# Patient Record
Sex: FEMALE | Race: WHITE | NOT HISPANIC OR LATINO | Employment: FULL TIME | URBAN - METROPOLITAN AREA
[De-identification: names, ages, dates, MRNs, and addresses within clinical notes are randomized per-mention and may not be internally consistent; named-entity substitution may affect disease eponyms.]

---

## 2017-01-03 ENCOUNTER — ALLSCRIPTS OFFICE VISIT (OUTPATIENT)
Dept: OTHER | Facility: OTHER | Age: 32
End: 2017-01-03

## 2017-01-03 DIAGNOSIS — F17.210 CIGARETTE NICOTINE DEPENDENCE, UNCOMPLICATED: ICD-10-CM

## 2017-01-03 DIAGNOSIS — N94.6 DYSMENORRHEA: ICD-10-CM

## 2017-01-03 DIAGNOSIS — G43.909 MIGRAINE WITHOUT STATUS MIGRAINOSUS, NOT INTRACTABLE: ICD-10-CM

## 2017-04-20 DIAGNOSIS — K80.20 CALCULUS OF GALLBLADDER WITHOUT CHOLECYSTITIS WITHOUT OBSTRUCTION: ICD-10-CM

## 2017-04-20 DIAGNOSIS — R93.2 ABNORMAL FINDINGS ON DIAGNOSTIC IMAGING OF LIVER AND BILIARY TRACT: ICD-10-CM

## 2017-04-20 DIAGNOSIS — R05.9 COUGH: ICD-10-CM

## 2017-04-20 DIAGNOSIS — R53.83 OTHER FATIGUE: ICD-10-CM

## 2017-04-29 LAB — HCV AB SER-ACNC: NEGATIVE

## 2017-09-11 ENCOUNTER — ALLSCRIPTS OFFICE VISIT (OUTPATIENT)
Dept: OTHER | Facility: OTHER | Age: 32
End: 2017-09-11

## 2017-09-11 DIAGNOSIS — F41.9 ANXIETY DISORDER: ICD-10-CM

## 2017-09-11 DIAGNOSIS — R00.0 TACHYCARDIA: ICD-10-CM

## 2017-09-11 DIAGNOSIS — R35.0 FREQUENCY OF MICTURITION: ICD-10-CM

## 2017-09-12 ENCOUNTER — GENERIC CONVERSION - ENCOUNTER (OUTPATIENT)
Dept: OTHER | Facility: OTHER | Age: 32
End: 2017-09-12

## 2017-09-25 ENCOUNTER — GENERIC CONVERSION - ENCOUNTER (OUTPATIENT)
Dept: FAMILY MEDICINE CLINIC | Facility: CLINIC | Age: 32
End: 2017-09-25

## 2017-09-25 ENCOUNTER — GENERIC CONVERSION - ENCOUNTER (OUTPATIENT)
Dept: OTHER | Facility: OTHER | Age: 32
End: 2017-09-25

## 2017-11-09 ENCOUNTER — ALLSCRIPTS OFFICE VISIT (OUTPATIENT)
Dept: OTHER | Facility: OTHER | Age: 32
End: 2017-11-09

## 2017-11-11 NOTE — PROGRESS NOTES
Assessment    1  Migraine, unspecified, not intractable, without status migrainosus (346 90) (G43 909)  2  Wheezing (786 07) (R06 2)  3  Anxiety (300 00) (F41 9)    Plan  Anxiety    · Renew: LORazepam 0 5 MG Oral Tablet; TAKE 1 TABLET 3 TIMEs daily and TWO at [facility]   · Renew: Sertraline HCl - 50 MG Oral Tablet; Take 1 and 1/2 tablet daily   · Continue with our present treatment plan ; Status:Complete;   Done: [id number]  Wheezing    · Start: ProAir  (90 Base) MCG/ACT Inhalation Aerosol Solution; INHALE 2 PUFFSEVERY 4-6 HOURS AS NEEDED    Discussion/Summary    Patient is here for anxiety check  She is doing better overall  A we will up her sertraline to 75 mg daily  She will update me on some in about 2-4 weeks on how she is doing  We will make a formal follow-up pending her clinical improvement has got the flu shot at work  The patient was counseled regarding instructions for management,-- impressions,-- importance of compliance with treatment  total time of encounter was 20 minutes-- and-- 50% minutes was spent counseling  Chief Complaint  F/U anxiety  ksd,cma      History of Present Illness  Another death in the family  Patient's anxiety level has been fair  She is doing better at nighttime  She try to not use the lorazepam at bedtime for several nights and did well for a bit  She feels that she would like to go up a bit on the sertraline  She also is interested in starting the Wellbutrin for smoking cessation  Encouraged her to do so and advised her that there is no interaction to taking SSRI and the dopamine agent  The patient is being seen for follow-up of and Patient needs refill on her p r n  albuterol wheezing  The patient is being seen for follow-up of generalized anxiety disorder and With some panic  The patient reports doing well and Some moments of anxiety flares but overall doing better  Comorbid Illnesses: Fear of death  She has no comorbid illnesses    Interval symptoms:  improved anxiety,-- improved panic attacks-- and-- improved sleep disruption  Associated symptoms: racing thoughts  Medications:  the patient is adherent to her medication regimen  Review of Systems   Constitutional: not feeling tired  Eyes: no purulent discharge from the eyes  ENT: no nasal discharge  Cardiovascular: no chest pain-- and-- no palpitations  Respiratory: no cough-- and-- no shortness of breath during exertion  Gastrointestinal: no constipation  Genitourinary: no incontinence  Musculoskeletal: no arthralgias  Neurological: headache-- and-- Migraines have been stable even with decreasing the Topamax  ROS reviewed  Active Problems  1  Anxiety (300 00) (F41 9)  2  Cigarette smoker (305 1) (F17 210)  3  Depression screening (V79 0) (Z13 89)  4  Encounter for smoking cessation counseling (V65 42,305 1) (Z71 6,Z72 0)  5  Fatigue (780 79) (R53 83)  6  Fear of death (300 20) (F40 8)  7  Gallstone (impacted) (574 20) (K80 20)  8  Migraine, unspecified, not intractable, without status migrainosus (346 90) (G43 909)  9  Mitral regurgitation (424 0) (I34 0)  10  Urine frequency (788 41) (R35 0)    Past Medical History  1  Encounter for smoking cessation counseling (V65 42,305 1) [phone]  0)    The active problems and past medical history were reviewed and updated today  Surgical History  1  History of Foot Surgery    The surgical history was reviewed and updated today  Family History  Mother   1  Family history of Meningioma  2  Family history of Seizure  Father   3  Family history of Cancer of kidney    The family history was reviewed and updated today  Social History     · Being A Social Drinker   · Cigarette smoker (305 1) (F17 210)   · Never A Smoker  The social history was reviewed and updated today  Current Meds  1  Acyclovir 800 MG Oral Tablet; Take one tab three times a day for up to five days ;  Therapy: 20TIX3712 to (Last XA:55KLX9446)  Requested for: 87TVT4603 Ordered  2  BuPROPion HCl ER (SR) 150 MG Oral Tablet Extended Release 12 Hour; TAKE 1 TABLET DAILY AS DIRECTED; Therapy: 09MPB5138 to (Evaluate:06Jun2017)  Requested for: 26KUY6778; Last Rx:08Mar2017 Ordered  3  Gildess FE 1 5/30 1 5-30 MG-MCG TABS; Therapy: 56YQM0593 to (Evaluate:68Psb0665) Recorded  4  Ibuprofen 200 MG Oral Capsule; Therapy: 58LJQ7933 to Recorded  5  LORazepam 0 5 MG Oral Tablet; TAKE 1 TABLET 3 TIMEs daily and TWO at HS PRN; Therapy: 67Tna1998 to (Evaluate:05Oct2017); Last Rx:69Dxb4334 Ordered  6  Mefenamic Acid 250 MG Oral Capsule; TAKE 2 CAPSULES NOW, THEN 1 CAPSULE EVERY 6 HOURS AS NEEDED; Therapy: 11KNL9295 to (Evaluate:19Jan2017)  Requested for: 44NDX2778; Last AV:37URC1257 Ordered  7  Sertraline HCl - 50 MG Oral Tablet; Take 1 tablet daily; Therapy: 09Nas9617 to (Evaluate:10Nov2017)  Requested for: 37HFP0085; Last Rx:99Euu6282 Ordered  8  Topiramate 25 MG Oral Tablet; three P O  Pm; Therapy: 30VQY3737 to (Aleta Locker)  Requested for: 34ZYZ5179; Last XF:78FTR0482 Ordered  9  Vitamin B12 TABS; Therapy: (Recorded:11Jan2017) to Recorded  10  Vitamin D3 TABS; Therapy: (Recorded:11Jan2017) to Recorded    Allergies  1  No Known Drug Allergies    Vitals  Vital Signs    Recorded: 50NPL4204 12:51PM Recorded: 49DVB5009 90:58AZ   Systolic 749    Diastolic 62    Height  5 ft 2 in   Weight  230 lb 8 oz   BMI Calculated  42 16   BSA Calculated  2 03       Physical Exam   Constitutional  General appearance: No acute distress, well appearing and well nourished  well developed,-- obese-- and-- appearance reflects stated age  Ears, Nose, Mouth, and Throat  Nasal mucosa, septum, and turbinates: Normal without edema or erythema  Oropharynx: Normal with no erythema, edema, exudate or lesions  Pulmonary  Auscultation of lungs: Clear to auscultation  Cardiovascular  Auscultation of heart: Normal rate and rhythm, normal S1 and S2, without murmurs  Abdomen  Abdomen: Non-tender, no masses

## 2018-01-13 VITALS
WEIGHT: 226.5 LBS | BODY MASS INDEX: 41.68 KG/M2 | SYSTOLIC BLOOD PRESSURE: 118 MMHG | HEIGHT: 62 IN | DIASTOLIC BLOOD PRESSURE: 80 MMHG

## 2018-01-14 VITALS
WEIGHT: 224.13 LBS | BODY MASS INDEX: 41.24 KG/M2 | HEIGHT: 62 IN | DIASTOLIC BLOOD PRESSURE: 72 MMHG | SYSTOLIC BLOOD PRESSURE: 112 MMHG

## 2018-01-14 VITALS
BODY MASS INDEX: 42.42 KG/M2 | SYSTOLIC BLOOD PRESSURE: 118 MMHG | DIASTOLIC BLOOD PRESSURE: 62 MMHG | HEIGHT: 62 IN | WEIGHT: 230.5 LBS

## 2018-01-22 VITALS — DIASTOLIC BLOOD PRESSURE: 72 MMHG | SYSTOLIC BLOOD PRESSURE: 118 MMHG

## 2018-01-22 VITALS — HEIGHT: 62 IN | WEIGHT: 226 LBS | BODY MASS INDEX: 41.59 KG/M2

## 2018-04-12 DIAGNOSIS — F32.A DEPRESSION, UNSPECIFIED DEPRESSION TYPE: Primary | ICD-10-CM

## 2018-04-17 RX ORDER — CHOLECALCIFEROL (VITAMIN D3) 125 MCG
CAPSULE ORAL
COMMUNITY

## 2018-04-17 RX ORDER — LANOLIN ALCOHOL/MO/W.PET/CERES
CREAM (GRAM) TOPICAL
COMMUNITY

## 2018-04-17 RX ORDER — TOPIRAMATE 25 MG/1
TABLET ORAL
COMMUNITY
Start: 2011-09-15 | End: 2021-09-14

## 2018-04-17 RX ORDER — BUPROPION HYDROCHLORIDE 150 MG/1
1 TABLET, EXTENDED RELEASE ORAL DAILY
COMMUNITY
Start: 2017-03-08 | End: 2020-07-10

## 2018-04-17 RX ORDER — MEFENAMIC ACID 250 MG/1
2 CAPSULE ORAL EVERY 6 HOURS PRN
COMMUNITY
Start: 2017-01-03

## 2018-04-17 RX ORDER — LORAZEPAM 0.5 MG/1
TABLET ORAL
COMMUNITY
Start: 2017-09-11 | End: 2018-04-18 | Stop reason: SDUPTHER

## 2018-04-17 RX ORDER — NORETHINDRONE ACETATE AND ETHINYL ESTRADIOL 1.5-30(21)
KIT ORAL
COMMUNITY
Start: 2014-08-11 | End: 2018-04-18 | Stop reason: SINTOL

## 2018-04-17 RX ORDER — ALBUTEROL SULFATE 90 UG/1
2 AEROSOL, METERED RESPIRATORY (INHALATION)
COMMUNITY
Start: 2017-11-09

## 2018-04-17 RX ORDER — OMEGA-3 FATTY ACIDS/FISH OIL 300-1000MG
CAPSULE ORAL
COMMUNITY
Start: 2014-11-03

## 2018-04-18 ENCOUNTER — OFFICE VISIT (OUTPATIENT)
Dept: FAMILY MEDICINE CLINIC | Facility: CLINIC | Age: 33
End: 2018-04-18

## 2018-04-18 VITALS
BODY MASS INDEX: 44.79 KG/M2 | SYSTOLIC BLOOD PRESSURE: 118 MMHG | HEIGHT: 62 IN | WEIGHT: 243.4 LBS | DIASTOLIC BLOOD PRESSURE: 72 MMHG

## 2018-04-18 DIAGNOSIS — F40.298 FEAR OF DEATH: ICD-10-CM

## 2018-04-18 DIAGNOSIS — R63.5 WEIGHT GAIN: ICD-10-CM

## 2018-04-18 DIAGNOSIS — R60.9 FLUID RETENTION: Primary | ICD-10-CM

## 2018-04-18 DIAGNOSIS — E66.01 MORBID OBESITY (HCC): ICD-10-CM

## 2018-04-18 DIAGNOSIS — F41.9 ANXIETY: ICD-10-CM

## 2018-04-18 PROBLEM — I34.0 MITRAL REGURGITATION: Status: ACTIVE | Noted: 2017-01-03

## 2018-04-18 LAB
SL AMB  POCT GLUCOSE, UA: ABNORMAL
SL AMB LEUKOCYTE ESTERASE,UA: ABNORMAL
SL AMB POCT BILIRUBIN,UA: ABNORMAL
SL AMB POCT BLOOD,UA: ABNORMAL
SL AMB POCT CLARITY,UA: ABNORMAL
SL AMB POCT COLOR,UA: YELLOW
SL AMB POCT KETONES,UA: ABNORMAL
SL AMB POCT NITRITE,UA: ABNORMAL
SL AMB POCT PH,UA: 5
SL AMB POCT SPECIFIC GRAVITY,UA: 1
SL AMB POCT URINE PROTEIN: ABNORMAL
SL AMB POCT UROBILINOGEN: ABNORMAL

## 2018-04-18 PROCEDURE — 81002 URINALYSIS NONAUTO W/O SCOPE: CPT | Performed by: FAMILY MEDICINE

## 2018-04-18 PROCEDURE — 99214 OFFICE O/P EST MOD 30 MIN: CPT | Performed by: FAMILY MEDICINE

## 2018-04-18 RX ORDER — LORAZEPAM 0.5 MG/1
0.5 TABLET ORAL
Qty: 30 TABLET | Refills: 3 | Status: SHIPPED | OUTPATIENT
Start: 2018-04-18 | End: 2020-07-10 | Stop reason: SDUPTHER

## 2018-04-18 RX ORDER — TRIAMTERENE AND HYDROCHLOROTHIAZIDE 37.5; 25 MG/1; MG/1
1 CAPSULE ORAL EVERY MORNING
Qty: 30 CAPSULE | Refills: 0 | Status: SHIPPED | OUTPATIENT
Start: 2018-04-18 | End: 2020-07-10

## 2018-04-18 NOTE — PROGRESS NOTES
Assessment/Plan:   Diagnoses and all orders for this visit:    Fluid retention  -     TSH, 3rd generation; Future  -     Comprehensive metabolic panel; Future  -     HEMOGLOBIN A1C W/ EAG ESTIMATION; Future  -     triamterene-hydrochlorothiazide (DYAZIDE) 37 5-25 mg per capsule; Take 1 capsule by mouth every morning  -     POCT urine dip    Weight gain  -     TSH, 3rd generation; Future  -     Comprehensive metabolic panel; Future  -     HEMOGLOBIN A1C W/ EAG ESTIMATION; Future  -     triamterene-hydrochlorothiazide (DYAZIDE) 37 5-25 mg per capsule; Take 1 capsule by mouth every morning    Morbid obesity (HCC)  -     Discontinue: Naltrexone-Bupropion HCl ER (CONTRAVE) 8-90 MG TB12; Take 1 tablet by mouth daily  -     Naltrexone-Bupropion HCl ER (CONTRAVE) 8-90 MG TB12; Take 1 tablet by mouth daily    Anxiety  -     LORazepam (ATIVAN) 0 5 mg tablet; Take 1 tablet (0 5 mg total) by mouth daily at bedtime    Fear of death  -     LORazepam (ATIVAN) 0 5 mg tablet; Take 1 tablet (0 5 mg total) by mouth daily at bedtime    Other orders  -     buPROPion (WELLBUTRIN SR) 150 mg 12 hr tablet; Take 1 tablet by mouth daily  -     Discontinue: norethindrone-ethinyl estradiol-iron (GILDESS FE 1 5/30) 1 5-30 MG-MCG tablet; Take by mouth  -     Ibuprofen 200 MG CAPS; Take by mouth  -     Discontinue: LORazepam (ATIVAN) 0 5 mg tablet; Take by mouth  -     Mefenamic Acid 250 MG CAPS; Take 2 capsules by mouth every 6 (six) hours as needed  -     albuterol (PROAIR HFA) 90 mcg/act inhaler; Inhale 2 puffs  -     topiramate (TOPAMAX) 25 mg tablet; Take by mouth  -     cyanocobalamin (VITAMIN B-12) 1,000 mcg tablet;  Take by mouth  -     Cholecalciferol (VITAMIN D3) 2000 units TABS; Take by mouth         with regard to recent symptoms of fluid retention and overall gradual weight gain in the last several months and a trace amount of protein seen in the urine updated blood work will be ordered to check on renal function, thyroid and Diabetes screen  Doubt cardiac in nature as patient has had a normal echocardiogram with regards to ejection fraction in September of 2016  Exam is unremarkable  Would use diuretic  With caution and hold for now until BUN and creatinine results are back  Patient will see if the Henri Aleman is covered with regards to treatment for obesity  Continued smoking cessation encouraged  Subjective:   Chief Complaint   Patient presents with    Edema     Has been retaining fluid in feet and ankles for the past 4-5 days      Patient ID: Santos Carreno is a 28 y o  female  Patient is a pleasant 18-year-old white female who is here for complaints of some water retention in the feet in the ankles for the past 4-5 days  There is no new dietary changes  There is no travel  Patient has been doing well since regards to migraine as well as anxiety  Unfortunately she is still smoking  She is not doing the Chantix  She is interested in discussing possible pharmacological intervention for treatment of obesity  Spot urine dipstick in the office did show trace protein  Obesity is worsening  Discussed the patient's BMI  The BMI is above average; BMI management plan is completed  General weight loss/lifestyle modification strategies discussed (elicit support from others; identify saboteurs; non-food rewards, etc)  Behavioral treatment: [employer] programs ( [employer])  Diet interventions: diet diary for the following  1500 calorie  Informal exercise measures discussed, e g  taking stairs instead of elevator  Pharmacotherapy as ordered  Follow up in: 1month and as needed  The following portions of the patient's history were reviewed and updated as appropriate: allergies, current medications, past family history, past medical history, past social history, past surgical history and problem list       Review of Systems   Constitutional: Positive for unexpected weight change  HENT: Negative  Respiratory:        Patient has some increase in shortness of breath and last exercise endurance although she is not really on a fitness plan at this point  She is blaming the symptoms on the weight she has had a recent echo which showed normal cardiac function and mild mitral regurg   Cardiovascular: Positive for leg swelling ( generalizedAnkle swelling and also feels like her hands are puffy)  Negative for chest pain  Gastrointestinal: Negative  Genitourinary: Negative  Musculoskeletal: Negative  Skin: Negative  Neurological: Negative  Migraines controlled   Psychiatric/Behavioral:        Patient is doing well from this standpoint anxiety control         Objective:  /72   Ht 5' 2" (1 575 m)   Wt 110 kg (243 lb 6 4 oz)   BMI 44 52 kg/m²  this weight does represent a 13 lb gain since last visit in November 2017 4/18/18  4:27 PM   LEUKOCYTE ESTERASE,UA neg     NITRITE,UA neg    SL AMB POCT UROBILINOGEN neg    SL AMB POCT URINE PROTEIN trace     PH,UA 5 0     BLOOD,UA neg     SPECIFIC GRAVITY,UA 1 005     KETONES,UA neg     BILIRUBIN,UA neg    GLUCOSE, UA neg     COLOR,UA yellow     CLARITY,UA hazy         Physical Exam   Constitutional: She is oriented to person, place, and time  She appears well-developed and well-nourished  Pleasant 69-year-old female who appears her stated age in no acute distress with BMI of 44%   HENT:   Head: Normocephalic and atraumatic  Eyes: EOM are normal  Pupils are equal, round, and reactive to light  Neck: Normal range of motion  Cardiovascular: Normal rate, regular rhythm and intact distal pulses  No murmur heard  Trace ankle edema   Pulmonary/Chest: Effort normal and breath sounds normal  No respiratory distress  She has no rales  Abdominal: Soft  Bowel sounds are normal  She exhibits no distension  Musculoskeletal: Normal range of motion  Neurological: She is alert and oriented to person, place, and time     Psychiatric: She has a normal mood and affect   Her behavior is normal  Judgment and thought content normal    Concerned

## 2018-04-20 ENCOUNTER — TELEPHONE (OUTPATIENT)
Dept: FAMILY MEDICINE CLINIC | Facility: CLINIC | Age: 33
End: 2018-04-20

## 2018-04-20 LAB — HBA1C MFR BLD HPLC: 5.5 %

## 2018-04-20 NOTE — TELEPHONE ENCOUNTER
Can try every other day  I would want her to lose more than 2 lb in water weight in 1 day so she can gauge by that a bit  Also if she feels that she is not retaining fluid she does not need to take it it may be totally p r n

## 2018-04-20 NOTE — TELEPHONE ENCOUNTER
Patient would like to know how she should take the medication  Everyday? Every other day?  Patient states it was based on the bloodwork

## 2019-06-19 DIAGNOSIS — F32.A DEPRESSION, UNSPECIFIED DEPRESSION TYPE: ICD-10-CM

## 2019-10-07 ENCOUNTER — TELEPHONE (OUTPATIENT)
Dept: FAMILY MEDICINE CLINIC | Facility: CLINIC | Age: 34
End: 2019-10-07

## 2019-10-07 DIAGNOSIS — B02.9 HERPES ZOSTER WITHOUT COMPLICATION: Primary | ICD-10-CM

## 2019-10-07 RX ORDER — VALACYCLOVIR HYDROCHLORIDE 500 MG/1
500 TABLET, FILM COATED ORAL 3 TIMES DAILY
Qty: 21 TABLET | Refills: 0 | Status: SHIPPED | OUTPATIENT
Start: 2019-10-07 | End: 2020-07-10

## 2019-10-07 RX ORDER — PREDNISONE 10 MG/1
TABLET ORAL
Qty: 21 EACH | Refills: 0 | Status: SHIPPED | OUTPATIENT
Start: 2019-10-07 | End: 2020-07-10

## 2019-10-07 NOTE — TELEPHONE ENCOUNTER
Spoke w/ pt and she will send in photo through [employer] of her rash  I will forward as soon as I receive it   Pt uses [address] [facility]

## 2019-10-07 NOTE — TELEPHONE ENCOUNTER
Can she forward a photo? If it is shingles it probably would have been painful and specifically broke out in dermatome  It is usually not itchy  Is there any way we could see a photo of it? I think probably prednisone would be a good choice to help with go away  I will send that in  What pharmacy?

## 2019-10-07 NOTE — TELEPHONE ENCOUNTER
Patient called and stated she believes she has had shingles for the past 10 days  She has had no pain but on the left side of her body there is a red itchy rash  She wants to know if you want to prescribe something for her to help this go away

## 2020-07-10 DIAGNOSIS — Z72.0 TOBACCO ABUSE: Primary | ICD-10-CM

## 2020-07-10 DIAGNOSIS — F41.9 ANXIETY: ICD-10-CM

## 2020-07-10 DIAGNOSIS — F40.298 FEAR OF DEATH: ICD-10-CM

## 2020-07-10 DIAGNOSIS — F32.A DEPRESSION, UNSPECIFIED DEPRESSION TYPE: ICD-10-CM

## 2020-07-10 RX ORDER — LORAZEPAM 0.5 MG/1
0.5 TABLET ORAL
Qty: 30 TABLET | Refills: 0 | Status: SHIPPED | OUTPATIENT
Start: 2020-07-10 | End: 2021-08-04 | Stop reason: SDUPTHER

## 2020-07-10 RX ORDER — VARENICLINE TARTRATE 25 MG
KIT ORAL
Qty: 53 TABLET | Refills: 0 | Status: SHIPPED | OUTPATIENT
Start: 2020-07-10 | End: 2021-09-14 | Stop reason: SDUPTHER

## 2020-07-10 RX ORDER — SERTRALINE HYDROCHLORIDE 100 MG/1
100 TABLET, FILM COATED ORAL DAILY
Qty: 90 TABLET | Refills: 3 | Status: SHIPPED | OUTPATIENT
Start: 2020-07-10 | End: 2021-08-04 | Stop reason: SDUPTHER

## 2020-08-11 DIAGNOSIS — J06.9 UPPER RESPIRATORY TRACT INFECTION, UNSPECIFIED TYPE: Primary | ICD-10-CM

## 2020-08-11 RX ORDER — AZITHROMYCIN 250 MG/1
TABLET, FILM COATED ORAL
Qty: 6 TABLET | Refills: 0 | Status: SHIPPED | OUTPATIENT
Start: 2020-08-11 | End: 2020-08-15

## 2020-11-10 DIAGNOSIS — R63.5 WEIGHT GAIN: ICD-10-CM

## 2020-11-10 DIAGNOSIS — R60.9 EDEMA, UNSPECIFIED TYPE: Primary | ICD-10-CM

## 2020-11-10 RX ORDER — TRIAMTERENE AND HYDROCHLOROTHIAZIDE 37.5; 25 MG/1; MG/1
1 CAPSULE ORAL EVERY MORNING
Qty: 30 CAPSULE | Refills: 0 | Status: SHIPPED | OUTPATIENT
Start: 2020-11-10

## 2021-08-17 ENCOUNTER — TELEPHONE (OUTPATIENT)
Dept: FAMILY MEDICINE CLINIC | Facility: CLINIC | Age: 36
End: 2021-08-17

## 2021-08-17 NOTE — TELEPHONE ENCOUNTER
I sent both those prescriptions into the pharmacy on August 5th when she originally requested  If they no longer at the pharmacy please have patient call back  They were probably put back off the shelf since they were not picked up     I will have to put them through again if that happened

## 2021-08-17 NOTE — TELEPHONE ENCOUNTER
I called Saniya she is aware of your message and pt will call the pharmacy and if they had to put the scripts back and need new scripts pt will call the office back

## 2021-08-17 NOTE — TELEPHONE ENCOUNTER
Dr Persaud pt is scheduled to see Gali Pritchett on 09/14/21 ,due to a schedule change  Pt needs a late apt this was the next available  Pt is going to run out of medication prior to this apt  Pt was previously told she needs an apt prior to any further refills   Pt requested I send a message for you to advise when you are back in the office next week to see if you would authorize refills since per pt you are her pcp   (pt aware you are out of the office 08/16/21-08/20/21)  Pt's number is [phone]

## 2021-08-17 NOTE — TELEPHONE ENCOUNTER
Per verbal conversation with pt her ativan is prn  Script has   Sertraline 100 mg pt takes 1 tablet by mouth daily pt has 10 days left  Lorazepam 0 5 mg  pt does not have any  medications , due to it is used sparingly, but the script  so pt disposed of them as in got ride of them   Pt's pharmacy is the [facility] on [facility]

## 2021-09-14 ENCOUNTER — OFFICE VISIT (OUTPATIENT)
Dept: FAMILY MEDICINE CLINIC | Facility: CLINIC | Age: 36
End: 2021-09-14

## 2021-09-14 VITALS
DIASTOLIC BLOOD PRESSURE: 64 MMHG | HEIGHT: 62 IN | OXYGEN SATURATION: 96 % | TEMPERATURE: 96 F | BODY MASS INDEX: 45.12 KG/M2 | HEART RATE: 97 BPM | WEIGHT: 245.2 LBS | SYSTOLIC BLOOD PRESSURE: 128 MMHG

## 2021-09-14 DIAGNOSIS — F41.9 ANXIETY: ICD-10-CM

## 2021-09-14 DIAGNOSIS — E66.01 MORBID OBESITY (HCC): ICD-10-CM

## 2021-09-14 DIAGNOSIS — G43.109 MIGRAINE WITH AURA AND WITHOUT STATUS MIGRAINOSUS, NOT INTRACTABLE: ICD-10-CM

## 2021-09-14 DIAGNOSIS — R03.0 ELEVATED BLOOD-PRESSURE READING WITHOUT DIAGNOSIS OF HYPERTENSION: ICD-10-CM

## 2021-09-14 DIAGNOSIS — Z00.00 HEALTH MAINTENANCE EXAMINATION: Primary | ICD-10-CM

## 2021-09-14 DIAGNOSIS — L98.9 NODULAR LESION ON SURFACE OF SKIN: ICD-10-CM

## 2021-09-14 DIAGNOSIS — S03.00XA DISLOCATION OF TEMPOROMANDIBULAR JOINT, INITIAL ENCOUNTER: ICD-10-CM

## 2021-09-14 DIAGNOSIS — Z72.0 TOBACCO ABUSE: ICD-10-CM

## 2021-09-14 DIAGNOSIS — J45.20 MILD INTERMITTENT REACTIVE AIRWAY DISEASE WITHOUT COMPLICATION: ICD-10-CM

## 2021-09-14 PROBLEM — J45.909 REACTIVE AIRWAY DISEASE: Status: ACTIVE | Noted: 2021-09-14

## 2021-09-14 PROCEDURE — 99385 PREV VISIT NEW AGE 18-39: CPT | Performed by: NURSE PRACTITIONER

## 2021-09-14 RX ORDER — VARENICLINE TARTRATE
KIT
Qty: 53 TABLET | Refills: 0 | Status: SHIPPED | OUTPATIENT
Start: 2021-09-14

## 2021-09-14 NOTE — ASSESSMENT & PLAN NOTE
Recommend derm f/u for yearly skin checks/ routine exams and monitoring  She will make appt with advanced dermatology

## 2021-09-14 NOTE — PROGRESS NOTES
Assessment/Plan:    Health Maintenance  Lifestyle Advice: begin progressive daily aerobic exercise program, follow a low fat, low cholesterol diet, reduce salt in diet and cooking and continue current medications  Diet: well balanced diet  Exercise: never  Dental: regular dental visits and brushes teeth twice daily  Vision: goes for regular eye exams, most recent eye exam <1 year and wears glasses  Preventative Health: Female Preventative: PAP is due and has appt 9/24    Anxiety  Stable on Zoloft 100 mg daily and ativan PRN  She uses this rarely  Migraine, unspecified, not intractable, without status migrainosus  Currently off of Topamax because she had improvement of migraines off of birth control  She had floaters but no pain with this  Reactive airway disease  Rare use of albuterol  Well controlled  Tobacco abuse  Tried chantix several years ago but had nightmares  This was resent last year but she didn't take it due to anxiety  Currently smoking 1 ppd  No chest pain/ wheezing/ chronic cough  Consider re-starting chantix  Use patch or other NRTs PRN  Thoroughly discussed smoking cessation with patient  She is to provide update 1 month after chantix  Advised to take evening dose more late afternoon to avoid side effect of nightmares  Elevated blood-pressure reading without diagnosis of hypertension  BP stable    Dislocation of jaw  Recommend ENT follow up  Referral given    Nodular lesion on surface of skin  Recommend derm f/u for yearly skin checks/ routine exams and monitoring  She will make appt with advanced dermatology  Diagnoses and all orders for this visit:    Health maintenance examination    Anxiety    Migraine with aura and without status migrainosus, not intractable    Mild intermittent reactive airway disease without complication    Tobacco abuse  -     varenicline (Chantix Starting Month Pak) 0 5 MG X 11 & 1 MG X 42 tablet;  Take one 0 5 mg tablet by mouth once daily for 3 days, then one 0 5 mg tablet by mouth twice daily for 4 days, then one 1 mg tablet by mouth twice daily  Elevated blood-pressure reading without diagnosis of hypertension    Morbid obesity (HCC)    Dislocation of temporomandibular joint, initial encounter  -     Ambulatory Referral to Otolaryngology; Future    Nodular lesion on surface of skin    Other orders  -     Cancel: Hepatitis C Antibody ([facility]); Future  -     Cancel: Liquid-based pap, screening ([facility]); Future                  Subjective:      Patient ID: Mandy Aragon is a 39 y o  female  Here for well exam  Overall feeling well  Does have concerns regarding lesion on left lateral thigh and also her jaw cracking / popping with opening  She currently works as LPN  She feels safe at home  sexually active with male partner, no concerns for stds/ pregnancy  Has GYN appointment 9/24 at [address] [place] [address] done August 2021  The following portions of the patient's history were reviewed and updated as appropriate: allergies, current medications, past family history, past medical history, past social history, past surgical history and problem list     Review of Systems   Constitutional: Negative for chills and fever  Eyes: Negative for discharge  Respiratory: Negative for shortness of breath  Cardiovascular: Negative for chest pain  Gastrointestinal: Negative for constipation and diarrhea  Genitourinary: Negative for difficulty urinating  Musculoskeletal: Negative for joint swelling  Skin: Negative for rash  Neurological: Negative for headaches  Hematological: Negative for adenopathy  Psychiatric/Behavioral: The patient is not nervous/anxious            Objective:    /64 (BP Location: Left arm, Patient Position: Sitting, Cuff Size: Large)   Pulse 97   Temp (!) 96 °F (35 6 °C) (Temporal)   Ht 5' 1 5" (1 562 m)   Wt 111 kg (245 lb 3 2 oz)   LMP 08/14/2021 (Approximate)   SpO2 96%   BMI 45 58 kg/m² Physical Exam  Vitals and nursing note reviewed  Constitutional:       General: She is not in acute distress  Appearance: She is well-developed  She is obese  She is not ill-appearing, toxic-appearing or diaphoretic  HENT:      Head: Normocephalic and atraumatic  Jaw: No trismus, swelling or pain on movement  Comments: Dislocation of TMJ     Right Ear: Tympanic membrane, ear canal and external ear normal  There is no impacted cerumen  Left Ear: Tympanic membrane, ear canal and external ear normal  There is no impacted cerumen  Nose: Nose normal       Mouth/Throat:      Mouth: Mucous membranes are moist       Pharynx: Oropharynx is clear  No oropharyngeal exudate or posterior oropharyngeal erythema  Eyes:      General: Lids are normal  No scleral icterus  Right eye: No discharge  Left eye: No discharge  Extraocular Movements: Extraocular movements intact  Conjunctiva/sclera: Conjunctivae normal       Pupils: Pupils are equal, round, and reactive to light  Cardiovascular:      Rate and Rhythm: Normal rate and regular rhythm  Heart sounds: No murmur heard  Pulmonary:      Effort: Pulmonary effort is normal  No respiratory distress  Breath sounds: Normal breath sounds  No wheezing  Abdominal:      General: Bowel sounds are normal       Palpations: Abdomen is soft  Tenderness: There is no abdominal tenderness  Musculoskeletal:         General: No deformity  Normal range of motion  Cervical back: Neck supple  Skin:     General: Skin is warm and dry  Neurological:      General: No focal deficit present  Mental Status: She is alert and oriented to person, place, and time  Mental status is at baseline  Cranial Nerves: No cranial nerve deficit  Sensory: No sensory deficit  Motor: No weakness        Coordination: Coordination normal       Gait: Gait normal    Psychiatric:         Mood and Affect: Mood normal  Speech: Speech normal          Behavior: Behavior normal          Thought Content: Thought content normal          Judgment: Judgment normal              Patient has no known allergies  Saniya Wilson had no medications administered during this visit  Health Maintenance   Topic Date Due    Hepatitis C Screening  Never done    BMI: Adult  Never done    Cervical Cancer Screening  11/27/2020    Influenza Vaccine (1) 09/01/2021    BMI: Followup Plan  09/14/2022    Annual Physical  09/14/2022    DTaP,Tdap,and Td Vaccines (2 - Td or Tdap) 10/03/2023    HIV Screening  Completed    COVID-19 Vaccine  Completed    Pneumococcal Vaccine: Pediatrics (0 to 5 Years) and At-Risk Patients (6 to 59 Years)  Aged Out    HIB Vaccine  Aged Out    Hepatitis B Vaccine  Aged Out    IPV Vaccine  Aged Out    Hepatitis A Vaccine  Aged Out    Meningococcal ACWY Vaccine  Aged Out    HPV Vaccine  Aged Out    Depression Screening  Discontinued      Social History     Socioeconomic History    Marital status: /Civil Union     Spouse name: Not on file    Number of children: Not on file    Years of education: Not on file    Highest education level: Not on file   Occupational History    Not on file   Tobacco Use    Smoking status: Current Every Day Smoker     Packs/day: 1 00    Smokeless tobacco: Never Used   Vaping Use    Vaping Use: Never used   Substance and Sexual Activity    Alcohol use: Yes     Comment: Social    Drug use: Never    Sexual activity: Not on file   Other Topics Concern    Not on file   Social History Narrative    Not on file     Social Determinants of Health     Financial Resource Strain:     Difficulty of Paying Living Expenses:    Food Insecurity:     Worried About Running Out of Food in the Last Year:     Ran Out of Food in the Last Year:    Transportation Needs:     Lack of Transportation (Medical):      Lack of Transportation (Non-Medical):    Physical Activity:     Days of Exercise per Week:     Minutes of Exercise per Session:    Stress:     Feeling of Stress :    Social Connections:     Frequency of Communication with Friends and Family:     Frequency of Social Gatherings with Friends and Family:     Attends [employer] Services:     Active Member of Clubs or Organizations:     Attends Club or Organization Meetings:     Marital Status:    Intimate Partner Violence:     Fear of Current or Ex-Partner:     Emotionally Abused:     Physically Abused:     Sexually Abused:       Family History   Problem Relation Age of Onset    Other Mother         Meningioma;seizure    Kidney cancer Father       History reviewed  No pertinent past medical history  has a past surgical history that includes Foot surgery     Patient Active Problem List    Diagnosis Date Noted    Reactive airway disease 09/14/2021    Dislocation of jaw 09/14/2021    Nodular lesion on surface of skin 09/14/2021    Fear of death 09/12/2017    Anxiety 09/11/2017    Mitral regurgitation 01/03/2017    Tobacco abuse 09/28/2015    Morbid obesity ([facility] [address] ) 05/02/2014    Elevated blood-pressure reading without diagnosis of hypertension 12/03/2013    Migraine, unspecified, not intractable, without status migrainosus 10/02/2012       Current Outpatient Medications:     albuterol (PROAIR HFA) 90 mcg/act inhaler, Inhale 2 puffs, Disp: , Rfl:     Cholecalciferol (VITAMIN D3) 2000 units TABS, Take by mouth, Disp: , Rfl:     cyanocobalamin (VITAMIN B-12) 1,000 mcg tablet, Take by mouth, Disp: , Rfl:     Ibuprofen 200 MG CAPS, Take by mouth, Disp: , Rfl:     LORazepam (ATIVAN) 0 5 mg tablet, Take 1 tablet (0 5 mg total) by mouth daily at bedtime, Disp: 30 tablet, Rfl: 0    Mefenamic Acid 250 MG CAPS, Take 2 capsules by mouth every 6 (six) hours as needed, Disp: , Rfl:     sertraline (ZOLOFT) 100 mg tablet, Take 1 tablet (100 mg total) by mouth daily, Disp: 90 tablet, Rfl: 0    triamterene-hydrochlorothiazide (DYAZIDE) 37 5-25 mg per capsule, Take 1 capsule by mouth every morning, Disp: 30 capsule, Rfl: 0    varenicline (Chantix Starting Month Asael) 0 5 MG X 11 & 1 MG X 42 tablet, Take one 0 5 mg tablet by mouth once daily for 3 days, then one 0 5 mg tablet by mouth twice daily for 4 days, then one 1 mg tablet by mouth twice daily  , Disp: 53 tablet, Rfl: 0              BMI Counseling: Body mass index is 45 58 kg/m²  The BMI is above normal  Nutrition recommendations include limiting drinks that contain sugar and reducing intake of cholesterol  Exercise recommendations include exercising 3-5 times per week and strength training exercises  No pharmacotherapy was ordered  Rationale for BMI follow-up plan is due to patient being overweight or obese  Tobacco Cessation Counseling: Tobacco cessation counseling was provided  The patient is sincerely urged to quit consumption of tobacco  She is ready to quit tobacco  Medication options and side effects of medication discussed  Varenicline (chantix) was prescribed

## 2021-09-14 NOTE — ASSESSMENT & PLAN NOTE
Tried chantix several years ago but had nightmares  This was resent last year but she didn't take it due to anxiety  Currently smoking 1 ppd  No chest pain/ wheezing/ chronic cough  Consider re-starting chantix  Use patch or other NRTs PRN  Thoroughly discussed smoking cessation with patient  She is to provide update 1 month after chantix  Advised to take evening dose more late afternoon to avoid side effect of nightmares

## 2021-09-14 NOTE — ASSESSMENT & PLAN NOTE
Currently off of Topamax because she had improvement of migraines off of birth control  She had floaters but no pain with this

## 2021-09-14 NOTE — PATIENT INSTRUCTIONS
Continue with current medications  Follow up with dermatology and ENT  Start vitamin d, this is 2000 IU daily  [facility], [address] [place], [address] [phone]    Please call the office if you are experiencing any worsening of symptoms or no symptom improvement  Wellness Visit for Adults   AMBULATORY CARE:   A wellness visit  is when you see your healthcare provider to get screened for health problems  Your healthcare provider will also give you advice on how to stay healthy  Write down your questions so you remember to ask them  Ask your healthcare provider how often you should have a wellness visit  What happens at a wellness visit:  Your healthcare provider will ask about your health, and your family history of health problems  This includes high blood pressure, heart disease, and cancer  He or she will ask if you have symptoms that concern you, if you smoke, and about your mood  You may also be asked about your intake of medicines, supplements, food, and alcohol  Any of the following may be done:  · Your weight  will be checked  Your height may also be checked so your body mass index (BMI) can be calculated  Your BMI shows if you are at a healthy weight  · Your blood pressure  and heart rate will be checked  Your temperature may also be checked  · Blood and urine tests  may be done  Blood tests may be done to check your cholesterol levels  Abnormal cholesterol levels increase your risk for heart disease and stroke  You may also need a blood or urine test to check for diabetes if you are at increased risk  Urine tests may be done to look for signs of an infection or kidney disease  · A physical exam  includes checking your heartbeat and lungs with a stethoscope  Your healthcare provider may also check your skin to look for sun damage  · Screening tests  may be recommended   A screening test is done to check for diseases that may not cause symptoms  The screening tests you may need depend on your age, gender, family history, and lifestyle habits  For example, colorectal screening may be recommended if you are 48years old or older  Screening tests you need if you are a woman:   · A Pap smear  is used to screen for cervical cancer  Pap smears are usually done every 3 to 5 years depending on your age  You may need them more often if you have had abnormal Pap smear test results in the past  Ask your healthcare provider how often you should have a Pap smear  · A mammogram  is an x-ray of your breasts to screen for breast cancer  Experts recommend mammograms every 2 years starting at age 48 years  You may need a mammogram at age 52 years or younger if you have an increased risk for breast cancer  Talk to your healthcare provider about when you should start having mammograms and how often you need them  Vaccines you may need:   · Get an influenza vaccine  every year  The influenza vaccine protects you from the flu  Several types of viruses cause the flu  The viruses change over time, so new vaccines are made each year  · Get a tetanus-diphtheria (Td) booster vaccine  every 10 years  This vaccine protects you against tetanus and diphtheria  Tetanus is a severe infection that may cause painful muscle spasms and lockjaw  Diphtheria is a severe bacterial infection that causes a thick covering in the back of your mouth and throat  · Get a human papillomavirus (HPV) vaccine  if you are female and aged 23 to 32 or male 23 to 24 and never received it  This vaccine protects you from HPV infection  HPV is the most common infection spread by sexual contact  HPV may also cause vaginal, penile, and anal cancers  · Get a pneumococcal vaccine  if you are aged 72 years or older  The pneumococcal vaccine is an injection given to protect you from pneumococcal disease  Pneumococcal disease is an infection caused by pneumococcal bacteria  The infection may cause pneumonia, meningitis, or an ear infection  · Get a shingles vaccine  if you are 60 or older, even if you have had shingles before  The shingles vaccine is an injection to protect you from the varicella-zoster virus  This is the same virus that causes chickenpox  Shingles is a painful rash that develops in people who had chickenpox or have been exposed to the virus  How to eat healthy:  My Plate is a model for planning healthy meals  It shows the types and amounts of foods that should go on your plate  Fruits and vegetables make up about half of your plate, and grains and protein make up the other half  A serving of dairy is included on the side of your plate  The amount of calories and serving sizes you need depends on your age, gender, weight, and height  Examples of healthy foods are listed below:  · Eat a variety of vegetables  such as dark green, red, and orange vegetables  You can also include canned vegetables low in sodium (salt) and frozen vegetables without added butter or sauces  · Eat a variety of fresh fruits , canned fruit in 100% juice, frozen fruit, and dried fruit  · Include whole grains  At least half of the grains you eat should be whole grains  Examples include whole-wheat bread, wheat pasta, brown rice, and whole-grain cereals such as oatmeal     · Eat a variety of protein foods such as seafood (fish and shellfish), lean meat, and poultry without skin (turkey and chicken)  Examples of lean meats include pork leg, shoulder, or tenderloin, and beef round, sirloin, tenderloin, and extra lean ground beef  Other protein foods include eggs and egg substitutes, beans, peas, soy products, nuts, and seeds  · Choose low-fat dairy products such as skim or 1% milk or low-fat yogurt, cheese, and cottage cheese  · Limit unhealthy fats  such as butter, hard margarine, and shortening  Exercise:  Exercise at least 30 minutes per day on most days of the week   Some examples of exercise include walking, biking, dancing, and swimming  You can also fit in more physical activity by taking the stairs instead of the elevator or parking farther away from stores  Include muscle strengthening activities 2 days each week  Regular exercise provides many health benefits  It helps you manage your weight, and decreases your risk for type 2 diabetes, heart disease, stroke, and high blood pressure  Exercise can also help improve your mood  Ask your healthcare provider about the best exercise plan for you  General health and safety guidelines:   · Do not smoke  Nicotine and other chemicals in cigarettes and cigars can cause lung damage  Ask your healthcare provider for information if you currently smoke and need help to quit  E-cigarettes or smokeless tobacco still contain nicotine  Talk to your healthcare provider before you use these products  · Limit alcohol  A drink of alcohol is 12 ounces of beer, 5 ounces of wine, or 1½ ounces of liquor  · Lose weight, if needed  Being overweight increases your risk of certain health conditions  These include heart disease, high blood pressure, type 2 diabetes, and certain types of cancer  · Protect your skin  Do not sunbathe or use tanning beds  Use sunscreen with a SPF 15 or higher  Apply sunscreen at least 15 minutes before you go outside  Reapply sunscreen every 2 hours  Wear protective clothing, hats, and sunglasses when you are outside  · Drive safely  Always wear your seatbelt  Make sure everyone in your car wears a seatbelt  A seatbelt can save your life if you are in an accident  Do not use your cell phone when you are driving  This could distract you and cause an accident  Pull over if you need to make a call or send a text message  · Practice safe sex  Use latex condoms if are sexually active and have more than one partner   Your healthcare provider may recommend screening tests for sexually transmitted infections (STIs)  · Wear helmets, lifejackets, and protective gear  Always wear a helmet when you ride a bike or motorcycle, go skiing, or play sports that could cause a head injury  Wear protective equipment when you play sports  Wear a lifejacket when you are on a boat or doing water sports  © Copyright [address] 2021 Information is for End User's use only and may not be sold, redistributed or otherwise used for commercial purposes  All illustrations and images included in CareNotes® are the copyrighted property of A D A M , Inc  or [facility]   The above information is an  only  It is not intended as medical advice for individual conditions or treatments  Talk to your doctor, nurse or pharmacist before following any medical regimen to see if it is safe and effective for you  How to Stop Smoking   AMBULATORY CARE:   You will improve your health and the health of others around you  if you stop smoking  Your risk for heart and lung disease, cancer, stroke, heart attack, and vision problems will also decrease  Your adolescent can help prevent or stop harm to his or her brain or body  This will help him or her become a healthy adult  You can benefit from quitting no matter how long you have smoked  Prepare to stop smoking:  Nicotine is a highly addictive drug found in cigarettes  Withdrawal symptoms can happen when you stop smoking and make it hard to quit  These include anxiety, depression, irritability, trouble sleeping, and increased appetite  You increase your chances of success if you prepare to quit  · Set a quit date  Crys Zavala a date that is within the next 2 weeks  Do not pick a day that you think may be stressful or busy  Write down the day or [place] it on your calender  · Tell friends and family that you plan to quit  Explain that you may have withdrawal symptoms when you try to quit  Ask them to support you   They may be able to encourage you and help reduce your stress to make it easier for you to quit  · Make a list of your reasons for quitting  Put the list somewhere you will see it every day, such as your refrigerator  You can look at the list when you have a craving  · Remove all tobacco and nicotine products from your home, car, and workplace  Also, remove anything else that will tempt you to smoke, such as lighters, matches, or ashtrays  Clean your car, home, and places at work that smell like smoke  The smell of smoke can trigger a craving  · Identify triggers that make you want to smoke  This may include activities, feelings, or people  Also write down 1 way you can deal with each of your triggers  For example, if you want to smoke as soon as you wake up, plan another activity during this time, such as exercise  · Make a plan for how you will quit  Learn about the tools that can help you quit, such as medicine, counseling, or nicotine replacement therapy  Choose at least 2 options to help you quit  · Help your adolescent make a plan to quit  The plan will be more successful if your adolescent makes his or her own decisions  Do not try to pressure him or her to quit immediately or in a certain way  Be supportive and offer help if needed  Tools to help you stop smoking:   · Counseling  from a trained healthcare provider can provide you with support and skills to quit smoking  The provider will also teach you to manage your withdrawal symptoms and cravings  You may receive counseling from one counselor, in group therapy, or through phone therapy called a quit line  · Nicotine replacement therapy (NRT)  such as nicotine patches, gum, or lozenges may help reduce your nicotine cravings  You may get these without a doctor's order  Do not use e-cigarettes or smokeless tobacco in place of cigarettes or to help you quit  They still contain nicotine      · Prescription medicines  such as nasal sprays or nicotine inhalers may help reduce your withdrawal symptoms  Other medicines may also be used to reduce your urge to smoke  Ask your healthcare provider about these medicines  You may need to start certain medicines 2 weeks before your quit date for them to work well  · Hypnosis  is a practice that helps guide you through thoughts and feelings  Hypnosis may help decrease your cravings and make you more willing to quit  · Acupuncture therapy  uses very thin needles to balance energy channels in the body  This is thought to help decrease cravings and symptoms of nicotine withdrawal     · Support groups  let you talk to others who are trying to quit or have already quit  It may be helpful to speak with others about how they quit  Manage your cravings:   · Avoid situations, people, and places that tempt you to smoke  Go to nonsmoking places, such as libraries or restaurants  Understand what tempts you and try to avoid these things  · Keep your hands busy  Hold things such as a stress ball or pen  · Put candy or toothpicks in your mouth  Keep lollipops, sugarless gum, or toothpicks with you at all times  · Do not have alcohol or caffeine  These drinks may tempt you to smoke  Drink healthy liquids such as water or juice instead  · Reward yourself when you resist your cravings  Rewards will motivate you and help you stay positive  · Do an activity that distracts you from your craving  Examples include cleaning, creating art, or gardening  Prevent weight gain after you quit:  You may gain a few pounds after you quit smoking  It is healthier for you to gain a few pounds than to continue to smoke  The following can help you prevent weight gain:  · Eat a variety of healthy foods  Healthy foods include fruits, vegetables, whole-grain breads, low-fat dairy products, beans, lean meats, and fish  Eat healthy snacks, such as low-fat yogurt, if you get hungry between meals  · Drink water before, during, and between meals    This will make your stomach feel full and help prevent you from overeating  Ask your healthcare provider how much liquid to drink each day and which liquids are best for you  · Be physically active  Activity may help reduce your cravings and reduce stress  Take a walk or do some kind of physical activity every day  Ask your healthcare provider which activities are right for you  For support and more information:   · American Lung Association  [address] [facility],[address]  [address]  Phone: [facility] [address]  Phone: 9- 751 - 334-9104  Web Address: Traci Bonsai [employer]    · Smokefree  gov  Phone: 2- 451 - 172-0116  Web Address: www smokefree  [address] 2021 Information is for End User's use only and may not be sold, redistributed or otherwise used for commercial purposes  All illustrations and images included in CareNotes® are the copyrighted property of [employer]  or [address] [place] [address]  The above information is an  only  It is not intended as medical advice for individual conditions or treatments  Talk to your doctor, nurse or pharmacist before following any medical regimen to see if it is safe and effective for you

## 2021-12-14 DIAGNOSIS — F32.A DEPRESSION, UNSPECIFIED DEPRESSION TYPE: ICD-10-CM

## 2021-12-16 RX ORDER — SERTRALINE HYDROCHLORIDE 100 MG/1
100 TABLET, FILM COATED ORAL DAILY
Qty: 90 TABLET | Refills: 0 | Status: SHIPPED | OUTPATIENT
Start: 2021-12-16 | End: 2022-03-14 | Stop reason: SDUPTHER

## 2022-03-11 ENCOUNTER — TELEPHONE (OUTPATIENT)
Dept: ADMINISTRATIVE | Facility: OTHER | Age: 37
End: 2022-03-11

## 2022-03-11 NOTE — TELEPHONE ENCOUNTER
----- Message from Shanti Velázquez sent at 3/10/2022  1:54 PM EST -----  Regarding: care gap request Hep C  03/10/22 1:54 PM    Hello, our patient attached above has had Hepatitis C completed/performed  Please assist in updating the patient chart by pulling the Care Everywhere (CE) document  The date of service is 04/29/2017       Thank you,  [address] [place] [address]

## 2022-03-11 NOTE — TELEPHONE ENCOUNTER
Upon review of the In Basket request we were able to locate, review, and update the patient chart as requested for Pap Smear (HPV) aka Cervical Cancer Screening  Any additional questions or concerns should be emailed to the Practice Liaisons via [email or web address] email, please do not reply via In Basket      Thank you  Mike Elliott MA

## 2022-03-11 NOTE — TELEPHONE ENCOUNTER
----- Message from Naya Aguilar sent at 3/10/2022  1:55 PM EST -----  Regarding: care gap request - PAP  03/10/22 1:55 PM    Hello, our patient attached above has had Pap Smear (HPV) aka Cervical Cancer Screening completed/performed  Please assist in updating the patient chart by pulling the Care Everywhere (CE) document  The date of service is 09/24/2021       Thank you,  [address]

## 2022-03-11 NOTE — TELEPHONE ENCOUNTER
Upon review of the In Basket request we were able to locate, review, and update the patient chart as requested for Hepatitis C   Any additional questions or concerns should be emailed to the Practice Liaisons via [email or web address] email, please do not reply via In Basket      Thank you  Karin Santos MA

## 2022-03-14 ENCOUNTER — TELEMEDICINE (OUTPATIENT)
Dept: FAMILY MEDICINE CLINIC | Facility: CLINIC | Age: 37
End: 2022-03-14
Payer: COMMERCIAL

## 2022-03-14 DIAGNOSIS — F32.A DEPRESSION, UNSPECIFIED DEPRESSION TYPE: ICD-10-CM

## 2022-03-14 PROCEDURE — 99213 OFFICE O/P EST LOW 20 MIN: CPT | Performed by: NURSE PRACTITIONER

## 2022-03-14 RX ORDER — SERTRALINE HYDROCHLORIDE 100 MG/1
100 TABLET, FILM COATED ORAL DAILY
Qty: 90 TABLET | Refills: 1 | Status: SHIPPED | OUTPATIENT
Start: 2022-03-14 | End: 2023-03-09

## 2022-03-14 NOTE — PROGRESS NOTES
Virtual Regular Visit    Verification of patient location:    Patient is located in the following state in which I hold an active license [place]      Assessment/Plan:    Problem List Items Addressed This Visit     None      Visit Diagnoses     Depression, unspecified depression type        Relevant Medications    sertraline (ZOLOFT) 100 mg tablet        Continue with zoloft 100 mg daily and ativan daily PRN  Rare use of ativan at this time  Recheck 6 months and well visit  Advised to avoid any consistent use of NSAIDs while on SSRIs and discussed associated risks  Patient aware  Please call the office if you are experiencing any worsening of symptoms or no symptom improvement  Reason for visit is   Chief Complaint   Patient presents with    Virtual Regular Visit        Encounter provider Tru Jordan, [facility]    Provider located at [address] [facility] [address] [place] [id number] [phone]      Recent Visits  No visits were found meeting these conditions  Showing recent visits within past 7 days and meeting all other requirements  Today's Visits  Date Type Provider Dept   03/14/22 Telemedicine Tru Jordan, [facility]   Showing today's visits and meeting all other requirements  Future Appointments  No visits were found meeting these conditions  Showing future appointments within next 150 days and meeting all other requirements       The patient was identified by name and date of birth  Elenita Vasquez was informed that this is a telemedicine visit and that the visit is being conducted through [address] [place] [address] Now and patient was informed that this is a secure, HIPAA-compliant platform  She agrees to proceed     My office door was closed  No one else was in the room  She acknowledged consent and understanding of privacy and security of the video platform   The patient has agreed to participate and understands they can discontinue the visit at any time  Patient is aware this is a billable service  Subjective  Tania Pulido is a 44 y o  female  Virtual visit to discuss anxiety  Current Anxiety medications:   Zoloft 100 mg daily  Ativan 0 5 mg daily PRN (Last refill August 2021)  [place] prescription drug monitoring program was checked and verified for refill  No anxiety attacks/panic attacks  Not interfering with day to day activities  No therapy/ counseling  Overall feeling well and feels anxiety Is well controlled on current regimen  No past medical history on file  Past Surgical History:   Procedure Laterality Date    FOOT SURGERY         Current Outpatient Medications   Medication Sig Dispense Refill    albuterol (PROAIR HFA) 90 mcg/act inhaler Inhale 2 puffs      Cholecalciferol (VITAMIN D3) 2000 units TABS Take by mouth      cyanocobalamin (VITAMIN B-12) 1,000 mcg tablet Take by mouth      Ibuprofen 200 MG CAPS Take by mouth      LORazepam (ATIVAN) 0 5 mg tablet Take 1 tablet (0 5 mg total) by mouth daily at bedtime 30 tablet 0    Mefenamic Acid 250 MG CAPS Take 2 capsules by mouth every 6 (six) hours as needed      sertraline (ZOLOFT) 100 mg tablet Take 1 tablet (100 mg total) by mouth daily 90 tablet 1    triamterene-hydrochlorothiazide (DYAZIDE) 37 5-25 mg per capsule Take 1 capsule by mouth every morning 30 capsule 0    varenicline (Chantix Starting Month Pak) 0 5 MG X 11 & 1 MG X 42 tablet Take one 0 5 mg tablet by mouth once daily for 3 days, then one 0 5 mg tablet by mouth twice daily for 4 days, then one 1 mg tablet by mouth twice daily  53 tablet 0     No current facility-administered medications for this visit  No Known Allergies    Review of Systems   Constitutional: Negative for chills and fever  Eyes: Negative for discharge  Respiratory: Negative for shortness of breath  Cardiovascular: Negative for chest pain     Gastrointestinal: Negative for constipation and diarrhea  Genitourinary: Negative for difficulty urinating  Musculoskeletal: Negative for joint swelling  Skin: Negative for rash  Neurological: Negative for headaches  Hematological: Negative for adenopathy  Psychiatric/Behavioral: The patient is not nervous/anxious  Video Exam    There were no vitals filed for this visit  Physical Exam  Constitutional:       General: She is not in acute distress  Appearance: Normal appearance  She is not ill-appearing, toxic-appearing or diaphoretic  HENT:      Head: Normocephalic and atraumatic  Nose: Nose normal    Eyes:      General: No scleral icterus  Pulmonary:      Effort: Pulmonary effort is normal  No respiratory distress  Breath sounds: No wheezing (no audible wheezes)  Musculoskeletal:      Cervical back: Normal range of motion  Skin:     Coloration: Skin is not pale  Neurological:      Mental Status: She is alert and oriented to person, place, and time  Psychiatric:         Mood and Affect: Mood normal           I spent 20 minutes with patient today in which greater than 50% of the time was spent in counseling/coordination of care regarding anxiety       VIRTUAL VISIT Chris Justin verbally agrees to participate in [employer]  Pt is aware that [employer] could be limited without vital signs or the ability to perform a full hands-on physical exam  Saniya Colin understands she or the provider may request at any time to terminate the video visit and request the patient to seek care or treatment in person

## 2022-10-24 DIAGNOSIS — R63.5 WEIGHT GAIN: ICD-10-CM

## 2022-10-24 DIAGNOSIS — F32.A DEPRESSION, UNSPECIFIED DEPRESSION TYPE: ICD-10-CM

## 2022-10-24 DIAGNOSIS — R60.9 EDEMA, UNSPECIFIED TYPE: ICD-10-CM

## 2022-10-25 DIAGNOSIS — J45.20 MILD INTERMITTENT REACTIVE AIRWAY DISEASE WITHOUT COMPLICATION: Primary | ICD-10-CM

## 2022-10-25 RX ORDER — TRIAMTERENE AND HYDROCHLOROTHIAZIDE 37.5; 25 MG/1; MG/1
1 CAPSULE ORAL EVERY MORNING
Qty: 30 CAPSULE | Refills: 0 | Status: SHIPPED | OUTPATIENT
Start: 2022-10-25

## 2022-10-25 RX ORDER — SERTRALINE HYDROCHLORIDE 100 MG/1
100 TABLET, FILM COATED ORAL DAILY
Qty: 90 TABLET | Refills: 0 | Status: SHIPPED | OUTPATIENT
Start: 2022-10-25 | End: 2023-10-20

## 2022-10-25 RX ORDER — ALBUTEROL SULFATE 90 UG/1
2 AEROSOL, METERED RESPIRATORY (INHALATION) EVERY 6 HOURS PRN
Qty: 6.7 G | Refills: 0 | Status: SHIPPED | OUTPATIENT
Start: 2022-10-25

## 2022-10-26 DIAGNOSIS — E55.9 VITAMIN D DEFICIENCY: ICD-10-CM

## 2022-10-26 DIAGNOSIS — E03.8 SUBCLINICAL HYPOTHYROIDISM: ICD-10-CM

## 2022-10-26 DIAGNOSIS — R03.0 ELEVATED BLOOD-PRESSURE READING WITHOUT DIAGNOSIS OF HYPERTENSION: ICD-10-CM

## 2022-10-26 DIAGNOSIS — R73.09 ELEVATED GLUCOSE: ICD-10-CM

## 2022-10-26 DIAGNOSIS — E66.01 MORBID OBESITY (HCC): Primary | ICD-10-CM

## 2022-10-27 DIAGNOSIS — I34.0 NONRHEUMATIC MITRAL VALVE REGURGITATION: Primary | ICD-10-CM

## 2022-10-29 LAB — HBA1C MFR BLD HPLC: 5.6 %

## 2022-11-10 ENCOUNTER — OFFICE VISIT (OUTPATIENT)
Dept: FAMILY MEDICINE CLINIC | Facility: CLINIC | Age: 37
End: 2022-11-10

## 2022-11-10 VITALS
TEMPERATURE: 96.5 F | DIASTOLIC BLOOD PRESSURE: 78 MMHG | HEIGHT: 62 IN | WEIGHT: 251 LBS | SYSTOLIC BLOOD PRESSURE: 118 MMHG | BODY MASS INDEX: 46.19 KG/M2 | OXYGEN SATURATION: 97 % | HEART RATE: 86 BPM

## 2022-11-10 DIAGNOSIS — F41.9 ANXIETY: ICD-10-CM

## 2022-11-10 DIAGNOSIS — L73.2 HIDRADENITIS SUPPURATIVA: ICD-10-CM

## 2022-11-10 DIAGNOSIS — E55.9 VITAMIN D DEFICIENCY: ICD-10-CM

## 2022-11-10 DIAGNOSIS — F17.200 TOBACCO DEPENDENCE: ICD-10-CM

## 2022-11-10 DIAGNOSIS — J45.20 MILD INTERMITTENT REACTIVE AIRWAY DISEASE WITHOUT COMPLICATION: ICD-10-CM

## 2022-11-10 DIAGNOSIS — E66.01 MORBID OBESITY (HCC): ICD-10-CM

## 2022-11-10 DIAGNOSIS — Z00.00 HEALTH MAINTENANCE EXAMINATION: Primary | ICD-10-CM

## 2022-11-10 RX ORDER — BUPROPION HYDROCHLORIDE 150 MG/1
150 TABLET, EXTENDED RELEASE ORAL 2 TIMES DAILY
Qty: 60 TABLET | Refills: 1 | Status: SHIPPED | OUTPATIENT
Start: 2022-11-10

## 2022-11-10 RX ORDER — LORAZEPAM 0.5 MG/1
0.5 TABLET ORAL
Qty: 30 TABLET | Refills: 0 | Status: SHIPPED | OUTPATIENT
Start: 2022-11-10

## 2022-11-10 RX ORDER — CLINDAMYCIN PHOSPHATE 10 MG/ML
1 SOLUTION TOPICAL 2 TIMES DAILY
Qty: 180 PAD | Refills: 1 | Status: SHIPPED | OUTPATIENT
Start: 2022-11-10 | End: 2023-02-08

## 2022-11-10 NOTE — PATIENT INSTRUCTIONS
Start vitamin d, 5000IU daily x 30 days, then decrease to 2,000 IU daily  Take with food  Continue to follow with cardiology  Ativan refill sent to pharmacy  Recheck 6 months  Start wellbutrin for smoking cessation  Start 1 pill daily for 5-7 days, if well tolerated increase to 150 mg twice a day  Please call the office if you are experiencing any worsening of symptoms or no symptom improvement

## 2022-11-10 NOTE — PROGRESS NOTES
[address] [facility] PRIMARY CARE    NAME: Calvin Daley  AGE: 44 y o  SEX: female  : 1985     DATE: 2022     Assessment and Plan:     Problem List Items Addressed This Visit        Respiratory    Reactive airway disease     Reports minimal use of Albuterol inhaler  Mostly needed for when she is sick  Continue as needed            Other    Anxiety     Stable on current medications  Continue Zoloft Daily and Ativan PRN  Increased stress as of lately concerning health, recommended therapy as an option  She declined for now  Has good support system at home  Relevant Medications    LORazepam (ATIVAN) 0 5 mg tablet    Morbid obesity ([address] )      Other Visit Diagnoses     Health maintenance examination    -  Primary    Hidradenitis suppurativa        Relevant Medications    clindamycin (CLEOCIN T) 1 %    Tobacco dependence        Relevant Medications    buPROPion (WELLBUTRIN SR) 150 mg 12 hr tablet    Vitamin D deficiency            Reactive Airway Disease  -Reports minimal use of Albuterol inhaler  Mostly needed for when she is sick  Continue as needed    Severe mitral stenosis  - Follows with Cardiology   - Has TTE scheduled     Anxiety  - Stable on current medications  Continue Zoloft Daily and Ativan PRN  Increased stress as of lately concerning health, recommended therapy as an option  She declined for now  Has good support system at home  Tobacco Dependence  - Start wellbutrin for smoking cessation  Start 1 pill daily for 5-7 days, if well tolerated increase to 150 mg twice a day  May use OTC nicotine patch as needed to help with cessation   Advised on symptoms of nicotine overdose and how to manage cessation  Follow up in 12 weeks       Hidradenitis Suppurativa   - continue Clindamycin topical B I D    - follow up with dermatology     Vitamin D deficiency   - Start vitamin d, 5000IU daily x 30 days, then decrease to 2,000 IU daily  Take with food  Please call the office if symptoms worsen or do not improve      Immunizations and preventive care screenings were discussed with patient today  Appropriate education was printed on patient's after visit summary  Counseling:  Exercise: the importance of regular exercise/physical activity was discussed  Recommend exercise 3-5 times per week for at least 30 minutes  Return in about 6 months (around 5/10/2023) for [address] with Lexi Toledo   Chief Complaint:     Chief Complaint   Patient presents with   • Physical Exam     Pt would like to discuss echo, needs a refill on her lorazepam, would like to know if you can refill a clindamycin lotion she received from the gyn       History of Present Illness:     Adult Annual Physical   Patient here for a comprehensive physical exam  The patient reports problems: reports increased swelling in ankles and feet that is more than usual  She reports that swelling is more apparent during her menstural cycle  She takes Dyazide as needed for leg swelling  States when she last took lost medication she lost 6lbs  Reports minimal use of medication  She states she has had an increase in salty foods and poor diet  Reports otherwise doing okay aside from increase in stress form her results of last echo that showed severe mitral stenosis  She states she has dyspnea with exertion that resolves with rest  She states that her  notices she is more out of breath but patient does not notice  She follows with Cardiology as needed and just re-established care 11/1  She is scheduled for TTE on 11/27  Diet and Physical Activity  Diet/Nutrition: poor diet and limited fruits/vegetables  Exercise: no formal exercise        Depression Screening  PHQ-2/9 Depression Screening    Little interest or pleasure in doing things: 0 - not at all  Feeling down, depressed, or hopeless: 0 - not at all       General Health  Sleep: sleeps well and gets 7-8 hours of sleep on average     Vision: goes for regular eye exams and wears glasses  Dental: regular dental visits  /GYN Health  Last menstrual period: October 20 2022  Contraceptive method:  had vasectomy about a year ago  History of STDs?: no  Follow regularly with OB     Review of Systems:     Review of Systems   Constitutional: Negative for activity change, chills, fatigue, fever and unexpected weight change  Respiratory: Positive for shortness of breath  Negative for chest tightness  Cardiovascular: Positive for leg swelling  Negative for chest pain  Gastrointestinal: Negative for abdominal pain, blood in stool and constipation  Genitourinary: Negative for frequency and urgency  Neurological: Negative for dizziness and light-headedness  Psychiatric/Behavioral: Negative for self-injury, sleep disturbance and suicidal ideas        Past Medical History:     Past Medical History:   Diagnosis Date   • Severe mitral valve stenosis       Past Surgical History:     Past Surgical History:   Procedure Laterality Date   • FOOT SURGERY        Social History:     Social History     Socioeconomic History   • Marital status: /Civil Union     Spouse name: None   • Number of children: None   • Years of education: None   • Highest education level: None   Occupational History   • None   Tobacco Use   • Smoking status: Current Every Day Smoker     Packs/day: 1 00   • Smokeless tobacco: Never Used   Vaping Use   • Vaping Use: Never used   Substance and Sexual Activity   • Alcohol use: Yes     Comment: Social   • Drug use: Never   • Sexual activity: None   Other Topics Concern   • None   Social History Narrative   • None     Social Determinants of Health     Financial Resource Strain: Not on file   Food Insecurity: Not on file   Transportation Needs: Not on file   Physical Activity: Not on file   Stress: Not on file   Social Connections: Not on file   Intimate Partner Violence: Not on file   Housing Stability: Not on file      Family History:     Family History   Problem Relation Age of Onset   • Other Mother         Meningioma;seizure   • Kidney cancer Father       Current Medications:     Current Outpatient Medications   Medication Sig Dispense Refill   • albuterol (ProAir HFA) 90 mcg/act inhaler Inhale 2 puffs every 6 (six) hours as needed for wheezing 6 7 g 0   • buPROPion (WELLBUTRIN SR) 150 mg 12 hr tablet Take 1 tablet (150 mg total) by mouth 2 (two) times a day 60 tablet 1   • clindamycin (CLEOCIN T) 1 % Apply 1 pad topically 2 (two) times a day 180 pad 1   • Ibuprofen 200 MG CAPS Take by mouth     • LORazepam (ATIVAN) 0 5 mg tablet Take 1 tablet (0 5 mg total) by mouth daily at bedtime 30 tablet 0   • sertraline (ZOLOFT) 100 mg tablet Take 1 tablet (100 mg total) by mouth daily 90 tablet 0   • triamterene-hydrochlorothiazide (DYAZIDE) 37 5-25 mg per capsule Take 1 capsule by mouth every morning (Patient taking differently: Take 1 capsule by mouth if needed) 30 capsule 0     No current facility-administered medications for this visit  Allergies:     No Known Allergies   Physical Exam:     /78 (BP Location: Left arm, Patient Position: Sitting, Cuff Size: Large)   Pulse 86   Temp (!) 96 5 °F (35 8 °C) (Temporal)   Ht 5' 1 75" (1 568 m)   Wt 114 kg (251 lb)   LMP 10/20/2022 (Exact Date)   SpO2 97%   BMI 46 28 kg/m²     Physical Exam  Constitutional:       General: She is not in acute distress  Appearance: Normal appearance  She is obese  HENT:      Head: Normocephalic and atraumatic  Mouth/Throat:      Mouth: Mucous membranes are moist       Pharynx: No oropharyngeal exudate  Eyes:      Extraocular Movements: Extraocular movements intact  Pupils: Pupils are equal, round, and reactive to light  Cardiovascular:      Rate and Rhythm: Normal rate and regular rhythm  Heart sounds: Murmur heard     Pulmonary:      Effort: Pulmonary effort is normal  No respiratory distress  Breath sounds: Normal breath sounds  No wheezing or rales  Abdominal:      General: Bowel sounds are normal       Palpations: Abdomen is soft  Musculoskeletal:      Right lower leg: Edema present  Left lower leg: Edema present  Lymphadenopathy:      Cervical: No cervical adenopathy  Skin:     Findings: No erythema or rash  Comments: Diffuse nodular skin lesions Present on b/l axilla and lower abdomen most consistent with hidradenitis suppurativa    Neurological:      General: No focal deficit present  Mental Status: She is alert and oriented to person, place, and time  Cranial Nerves: No cranial nerve deficit            RIA Ashby   [facility]

## 2022-11-11 NOTE — ASSESSMENT & PLAN NOTE
Start wellbutrin for smoking cessation  Start 1 pill daily for 5-7 days, if well tolerated increase to 150 mg twice a day     May use OTC nicotine patch as needed to help with cessation

## 2022-11-11 NOTE — ASSESSMENT & PLAN NOTE
Stable on current medications  Continue Zoloft Daily and Ativan PRN  Increased stress as of lately concerning health, recommended therapy as an option  She declined for now  Has good support system at home

## 2023-01-09 ENCOUNTER — TELEPHONE (OUTPATIENT)
Dept: FAMILY MEDICINE CLINIC | Facility: CLINIC | Age: 38
End: 2023-01-09

## 2023-01-09 DIAGNOSIS — F17.200 TOBACCO DEPENDENCE: Primary | ICD-10-CM

## 2023-01-09 RX ORDER — BUPROPION HYDROCHLORIDE 150 MG/1
150 TABLET ORAL EVERY MORNING
Qty: 90 TABLET | Refills: 0 | Status: SHIPPED | OUTPATIENT
Start: 2023-01-09

## 2023-01-09 NOTE — TELEPHONE ENCOUNTER
----- Message from Zack Tobias sent at 1/9/2023  7:49 AM EST -----  Regarding: FW: Wellbutrin Update  Contact: [phone]    ----- Message -----  From: Jaxson Garcia  Sent: 1/6/2023   8:14 PM EST  To: [facility]  Subject: Wellbutrin Update                                I’ll try the extended release since I’m not having any side effects  You can send it to the pharmacy at [facility]     Thank you!

## 2023-02-15 DIAGNOSIS — F32.A DEPRESSION, UNSPECIFIED DEPRESSION TYPE: ICD-10-CM

## 2023-02-16 RX ORDER — SERTRALINE HYDROCHLORIDE 100 MG/1
100 TABLET, FILM COATED ORAL DAILY
Qty: 90 TABLET | Refills: 0 | Status: SHIPPED | OUTPATIENT
Start: 2023-02-16 | End: 2024-02-11

## 2023-05-10 ENCOUNTER — RA CDI HCC (OUTPATIENT)
Dept: OTHER | Facility: HOSPITAL | Age: 38
End: 2023-05-10

## 2023-05-10 NOTE — PROGRESS NOTES
[facility]  coding opportunities          Chart Reviewed number of suggestions sent to Provider: 1   J45 20    Patients Insurance        Commercial Insurance: [employer]

## 2023-05-17 ENCOUNTER — OFFICE VISIT (OUTPATIENT)
Dept: FAMILY MEDICINE CLINIC | Facility: CLINIC | Age: 38
End: 2023-05-17

## 2023-05-17 DIAGNOSIS — Z72.0 TOBACCO ABUSE: ICD-10-CM

## 2023-05-17 DIAGNOSIS — I34.0 MITRAL VALVE INSUFFICIENCY, UNSPECIFIED ETIOLOGY: Primary | ICD-10-CM

## 2023-05-17 DIAGNOSIS — F41.9 ANXIETY: ICD-10-CM

## 2023-05-17 RX ORDER — VARENICLINE TARTRATE 25 MG
KIT ORAL
Qty: 53 EACH | Refills: 0 | Status: SHIPPED | OUTPATIENT
Start: 2023-05-17 | End: 2023-06-20

## 2023-05-17 NOTE — PROGRESS NOTES
Name: Willy Tejada      : 1985      MRN: [id number]  Encounter Provider: Jay Pritchard DO  Encounter Date: 2023   Encounter department: [facility] PRIMARY CARE    Assessment & Plan     1  Mitral valve insufficiency, unspecified etiology    2  Anxiety    3  Tobacco abuse  -     varenicline 0 5 MG X 11 & 1 MG X 42 tablet therapy pack; Take 0 5 mg by mouth daily for 3 days, THEN 1 mg 2 (two) times a day for 3 days, THEN 1 mg 2 (two) times a day for 28 days  BMI Counseling: Body mass index is 46 93 kg/m²  The BMI is above normal  Nutrition recommendations include decreasing portion sizes, encouraging healthy choices of fruits and vegetables, decreasing fast food intake, consuming healthier snacks, limiting drinks that contain sugar, moderation in carbohydrate intake, increasing intake of lean protein, reducing intake of saturated and trans fat and reducing intake of cholesterol  Exercise recommendations include exercising 3-5 times per week  Patient referred to PCP  Rationale for BMI follow-up plan is due to patient being overweight or obese  Depression Screening and Follow-up Plan: Patient was screened for depression during today's encounter  They screened negative with a PHQ-2 score of 0  Subjective     71-year-old female here for follow-up on anxiety  Patient has weaned herself off of the Wellbutrin and wants to get started back on the Chantix for smoking cessation  Patient working from home  Also having follow-up on mitral valve with cardiology  Echocardiogram 2022 with transesophageal echo in November     Recent home sleep study completed on 23 showed AHI of 3 0 (supine 5 2) events per hour with oxygen flower of 84% (time spent < 88%: 1 5 minutes)  Saniya's sleep study was negative for PADMA  Mitral Valve   Mildly thickened mitral valve leaflet with diastolic doming of the anterior mitral valve leaflet  No significant mitral annular calcification   No significant mitral leaflet calcification  No significant subchordal apparatus thickening/calcification  Moderate central mitral regurgitation [PISA radius 1 1 cm with regurgitant volume of 55 mm]  There is mild mitral stenosis  ( EROA of 1 9 cm² based on PISA radius of 1 1 cm   MVA 2 03 cm² based on PHT of 108 ms  Mean gradient of 6 mmHg at HR 88-94 bpm        Chief Complaint   Patient presents with   • Follow-up     Pt is here for follow up for anxiety      Review of Systems   Genitourinary: Negative for menstrual problem (recent endometrial biopsy)          Regular--- now   Psychiatric/Behavioral:        Doing better       Past Medical History:   Diagnosis Date   • Severe mitral valve stenosis      Past Surgical History:   Procedure Laterality Date   • FOOT SURGERY       Family History   Problem Relation Age of Onset   • Other Mother         Meningioma;seizure   • Kidney cancer Father      Social History     Socioeconomic History   • Marital status: /Civil Union     Spouse name: None   • Number of children: None   • Years of education: None   • Highest education level: None   Occupational History   • None   Tobacco Use   • Smoking status: Every Day     Packs/day: 1 00     Types: Cigarettes   • Smokeless tobacco: Never   Vaping Use   • Vaping Use: Never used   Substance and Sexual Activity   • Alcohol use: Yes     Comment: Social   • Drug use: Never   • Sexual activity: None   Other Topics Concern   • None   Social History Narrative   • None     Social Determinants of Health     Financial Resource Strain: Not on file   Food Insecurity: Not on file   Transportation Needs: Not on file   Physical Activity: Not on file   Stress: Not on file   Social Connections: Not on file   Intimate Partner Violence: Not on file   Housing Stability: Not on file     Current Outpatient Medications on File Prior to Visit   Medication Sig   • albuterol (ProAir HFA) 90 mcg/act inhaler Inhale 2 puffs every 6 (six) hours as needed for "wheezing   • Ibuprofen 200 MG CAPS Take by mouth   • LORazepam (ATIVAN) 0 5 mg tablet Take 1 tablet (0 5 mg total) by mouth daily at bedtime   • sertraline (ZOLOFT) 100 mg tablet Take 1 tablet (100 mg total) by mouth daily   • triamterene-hydrochlorothiazide (DYAZIDE) 37 5-25 mg per capsule Take 1 capsule by mouth every morning (Patient taking differently: Take 1 capsule by mouth if needed)     No Known Allergies  Immunization History   Administered Date(s) Administered   • COVID-19 PFIZER VACCINE 0 3 ML IM 12/30/2020, 01/23/2021, 11/24/2021   • INFLUENZA 10/08/2013, 10/06/2016, 11/05/2022   • Influenza Quadrivalent Preservative Free 3 years and older IM 10/30/2014, 10/03/2016   • Influenza, seasonal, injectable 10/25/2017   • Tdap 10/03/2013       Objective     /72   Pulse 85   Temp (!) 96 8 °F (36 °C) (Temporal)   Resp 16   Ht 5' 2\" (1 575 m)   Wt 116 kg (256 lb 9 6 oz)   SpO2 97%   BMI 46 93 kg/m²     Physical Exam  Vitals reviewed  Constitutional:       General: She is not in acute distress  Appearance: Normal appearance  She is well-developed  HENT:      Mouth/Throat:      Mouth: Mucous membranes are moist    Eyes:      Conjunctiva/sclera: Conjunctivae normal       Pupils: Pupils are equal, round, and reactive to light  Cardiovascular:      Rate and Rhythm: Normal rate and regular rhythm  Heart sounds: No murmur heard  Pulmonary:      Effort: Pulmonary effort is normal       Breath sounds: Normal breath sounds  Abdominal:      General: Bowel sounds are normal       Palpations: Abdomen is soft  There is no mass  Tenderness: There is no abdominal tenderness  Musculoskeletal:         General: Normal range of motion  Neurological:      Mental Status: She is alert and oriented to person, place, and time  Deep Tendon Reflexes: Reflexes are normal and symmetric     Psychiatric:         Mood and Affect: Mood normal          Behavior: Behavior normal          Thought Content: " Thought content normal          Judgment: Judgment normal        Shaggy Showers, DO

## 2023-05-22 VITALS
OXYGEN SATURATION: 97 % | BODY MASS INDEX: 47.22 KG/M2 | HEART RATE: 85 BPM | HEIGHT: 62 IN | TEMPERATURE: 96.8 F | SYSTOLIC BLOOD PRESSURE: 112 MMHG | WEIGHT: 256.6 LBS | DIASTOLIC BLOOD PRESSURE: 72 MMHG | RESPIRATION RATE: 16 BRPM

## 2023-06-15 DIAGNOSIS — F32.A DEPRESSION, UNSPECIFIED DEPRESSION TYPE: ICD-10-CM

## 2023-06-15 RX ORDER — SERTRALINE HYDROCHLORIDE 100 MG/1
100 TABLET, FILM COATED ORAL DAILY
Qty: 90 TABLET | Refills: 0 | Status: SHIPPED | OUTPATIENT
Start: 2023-06-15 | End: 2024-06-09

## 2023-10-02 DIAGNOSIS — F32.A DEPRESSION, UNSPECIFIED DEPRESSION TYPE: ICD-10-CM

## 2023-10-04 RX ORDER — SERTRALINE HYDROCHLORIDE 100 MG/1
100 TABLET, FILM COATED ORAL DAILY
Qty: 90 TABLET | Refills: 0 | Status: SHIPPED | OUTPATIENT
Start: 2023-10-04 | End: 2024-09-28

## 2024-01-10 DIAGNOSIS — F32.A DEPRESSION, UNSPECIFIED DEPRESSION TYPE: ICD-10-CM

## 2024-01-10 DIAGNOSIS — R60.9 EDEMA, UNSPECIFIED TYPE: ICD-10-CM

## 2024-01-10 DIAGNOSIS — J45.20 MILD INTERMITTENT REACTIVE AIRWAY DISEASE WITHOUT COMPLICATION: ICD-10-CM

## 2024-01-10 DIAGNOSIS — R63.5 WEIGHT GAIN: ICD-10-CM

## 2024-01-10 RX ORDER — SERTRALINE HYDROCHLORIDE 100 MG/1
100 TABLET, FILM COATED ORAL DAILY
Qty: 90 TABLET | Refills: 0 | Status: SHIPPED | OUTPATIENT
Start: 2024-01-10 | End: 2025-01-04

## 2024-01-10 RX ORDER — ALBUTEROL SULFATE 90 UG/1
2 AEROSOL, METERED RESPIRATORY (INHALATION) EVERY 6 HOURS PRN
Qty: 6.7 G | Refills: 0 | Status: SHIPPED | OUTPATIENT
Start: 2024-01-10

## 2024-01-11 RX ORDER — TRIAMTERENE AND HYDROCHLOROTHIAZIDE 37.5; 25 MG/1; MG/1
1 CAPSULE ORAL EVERY MORNING
Qty: 30 CAPSULE | Refills: 0 | Status: SHIPPED | OUTPATIENT
Start: 2024-01-11

## 2024-06-04 DIAGNOSIS — F32.A DEPRESSION, UNSPECIFIED DEPRESSION TYPE: ICD-10-CM

## 2024-06-05 DIAGNOSIS — F32.A DEPRESSION, UNSPECIFIED DEPRESSION TYPE: ICD-10-CM

## 2024-06-05 RX ORDER — SERTRALINE HYDROCHLORIDE 100 MG/1
100 TABLET, FILM COATED ORAL DAILY
Qty: 100 TABLET | Refills: 0 | Status: SHIPPED | OUTPATIENT
Start: 2024-06-05 | End: 2025-05-31

## 2024-06-05 RX ORDER — SERTRALINE HYDROCHLORIDE 100 MG/1
100 TABLET, FILM COATED ORAL DAILY
Qty: 90 TABLET | Refills: 0 | Status: SHIPPED | OUTPATIENT
Start: 2024-06-05 | End: 2024-06-05 | Stop reason: SDUPTHER

## 2024-09-06 ENCOUNTER — RA CDI HCC (OUTPATIENT)
Dept: OTHER | Facility: HOSPITAL | Age: 39
End: 2024-09-06

## 2024-09-06 NOTE — PROGRESS NOTES
HCC coding opportunities          Chart Reviewed number of suggestions sent to Provider: 1  J45.20  E66.01-no recent bmi     Patients Insurance        Commercial Insurance: [employer]

## 2024-09-13 ENCOUNTER — OFFICE VISIT (OUTPATIENT)
Dept: FAMILY MEDICINE CLINIC | Facility: CLINIC | Age: 39
End: 2024-09-13
Payer: COMMERCIAL

## 2024-09-13 VITALS
SYSTOLIC BLOOD PRESSURE: 112 MMHG | HEIGHT: 62 IN | OXYGEN SATURATION: 96 % | RESPIRATION RATE: 16 BRPM | TEMPERATURE: 97.7 F | WEIGHT: 263 LBS | BODY MASS INDEX: 48.4 KG/M2 | HEART RATE: 85 BPM | DIASTOLIC BLOOD PRESSURE: 68 MMHG

## 2024-09-13 DIAGNOSIS — Z13.220 SCREENING FOR LIPID DISORDERS: ICD-10-CM

## 2024-09-13 DIAGNOSIS — E03.8 SUBCLINICAL HYPOTHYROIDISM: ICD-10-CM

## 2024-09-13 DIAGNOSIS — R73.09 ELEVATED GLUCOSE: ICD-10-CM

## 2024-09-13 DIAGNOSIS — Z00.00 ANNUAL PHYSICAL EXAM: Primary | ICD-10-CM

## 2024-09-13 DIAGNOSIS — Z13.228 SCREENING FOR METABOLIC DISORDER: ICD-10-CM

## 2024-09-13 PROCEDURE — 99396 PREV VISIT EST AGE 40-64: CPT | Performed by: FAMILY MEDICINE

## 2024-09-13 RX ORDER — FUROSEMIDE 20 MG
20 TABLET ORAL DAILY
COMMUNITY
Start: 2024-05-17 | End: 2025-05-17

## 2024-09-13 RX ORDER — NORETHINDRONE 0.35 MG/1
TABLET ORAL
COMMUNITY

## 2024-09-13 NOTE — PATIENT INSTRUCTIONS
"Patient Education     Routine physical for adults   The Basics   Written by the doctors and editors at [facility]   What is a physical? -- A physical is a routine visit, or \"check-up,\" with your doctor. You might also hear it called a \"wellness visit\" or \"preventive visit.\"  During each visit, the doctor will:   Ask about your physical and mental health   Ask about your habits, behaviors, and lifestyle   Do an exam   Give you vaccines if needed   Talk to you about any medicines you take   Give advice about your health   Answer your questions  Getting regular check-ups is an important part of taking care of your health. It can help your doctor find and treat any problems you have. But it's also important for preventing health problems.  A routine physical is different from a \"sick visit.\" A sick visit is when you see a doctor because of a health concern or problem. Since physicals are scheduled ahead of time, you can think about what you want to ask the doctor.  How often should I get a physical? -- It depends on your age and health. In general, for people age 21 years and older:   If you are younger than 50 years, you might be able to get a physical every 3 years.   If you are 50 years or older, your doctor might recommend a physical every year.  If you have an ongoing health condition, like diabetes or high blood pressure, your doctor will probably want to see you more often.  What happens during a physical? -- In general, each visit will include:   Physical exam - The doctor or nurse will check your height, weight, heart rate, and blood pressure. They will also look at your eyes and ears. They will ask about how you are feeling and whether you have any symptoms that bother you.   Medicines - It's a good idea to bring a list of all the medicines you take to each doctor visit. Your doctor will talk to you about your medicines and answer any questions. Tell them if you are having any side effects that bother you. You " "should also tell them if you are having trouble paying for any of your medicines.   Habits and behaviors - This includes:   Your diet   Your exercise habits   Whether you smoke, drink alcohol, or use drugs   Whether you are sexually active   Whether you feel safe at home  Your doctor will talk to you about things you can do to improve your health and lower your risk of health problems. They will also offer help and support. For example, if you want to quit smoking, they can give you advice and might prescribe medicines. If you want to improve your diet or get more physical activity, they can help you with this, too.   Lab tests, if needed - The tests you get will depend on your age and situation. For example, your doctor might want to check your:   Cholesterol   Blood sugar   Iron level   Vaccines - The recommended vaccines will depend on your age, health, and what vaccines you already had. Vaccines are very important because they can prevent certain serious or deadly infections.   Discussion of screening - \"Screening\" means checking for diseases or other health problems before they cause symptoms. Your doctor can recommend screening based on your age, risk, and preferences. This might include tests to check for:   Cancer, such as breast, prostate, cervical, ovarian, colorectal, prostate, lung, or skin cancer   Sexually transmitted infections, such as chlamydia and gonorrhea   Mental health conditions like depression and anxiety  Your doctor will talk to you about the different types of screening tests. They can help you decide which screenings to have. They can also explain what the results might mean.   Answering questions - The physical is a good time to ask the doctor or nurse questions about your health. If needed, they can refer you to other doctors or specialists, too.  Adults older than 65 years often need other care, too. As you get older, your doctor will talk to you about:   How to prevent falling at " home   Hearing or vision tests   Memory testing   How to take your medicines safely   Making sure that you have the help and support you need at home  All topics are updated as new evidence becomes available and our peer review process is complete.  This topic retrieved from [employer] on: May 02, 2024.  Topic 770432 Version 1.0  Release: 32.4.3 - C32.122  © 2024 UpToDate, Inc. and/or its affiliates. All rights reserved.  Consumer Information Use and Disclaimer   Disclaimer: This generalized information is a limited summary of diagnosis, treatment, and/or medication information. It is not meant to be comprehensive and should be used as a tool to help the user understand and/or assess potential diagnostic and treatment options. It does NOT include all information about conditions, treatments, medications, side effects, or risks that may apply to a specific patient. It is not intended to be medical advice or a substitute for the medical advice, diagnosis, or treatment of a health care provider based on the health care provider's examination and assessment of a patient's specific and unique circumstances. Patients must speak with a health care provider for complete information about their health, medical questions, and treatment options, including any risks or benefits regarding use of medications. This information does not endorse any treatments or medications as safe, effective, or approved for treating a specific patient. UpToDate, Inc. and its affiliates disclaim any warranty or liability relating to this information or the use thereof.The use of this information is governed by the Terms of Use, available at https://www.[employer]ision Technologiesuwer.com/en/know/clinical-effectiveness-terms. 2024© UpToDate, Inc. and its affiliates and/or licensors. All rights reserved.  Copyright   © 2024 UpToDate, Inc. and/or its affiliates. All rights reserved.

## 2024-09-13 NOTE — PROGRESS NOTES
Adult Annual Physical  Name: Saniya Wilson      : 1985      MRN: [id number]  Encounter Provider: Chitra Herring DO  Encounter Date: 2024   Encounter department: [facility]    Assessment & Plan  Annual physical exam         Subclinical hypothyroidism    Orders:    Hemoglobin A1C; Future    TSH, 3rd generation; Future    Hemoglobin A1C    TSH, 3rd generation    Screening for lipid disorders    Orders:    Lipid Panel with Direct LDL reflex; Future    Lipid Panel with Direct LDL reflex    Screening for metabolic disorder    Orders:    Comprehensive metabolic panel; Future    Comprehensive metabolic panel    Elevated glucose    Orders:    Hemoglobin A1C; Future    Hemoglobin A1C    Immunizations and preventive care screenings were discussed with patient today. Appropriate education was printed on patient's after visit summary.    Counseling:  Alcohol/drug use: discussed moderation in alcohol intake, the recommendations for healthy alcohol use  Dental Health: discussed importance of regular tooth brushing, flossing, and dental visits.  Injury prevention: discussed safety/seat belts, safety helmets, smoke detectors, carbon dioxide detectors  Sexual health:   OB History          1    Para   1    Term   1            AB        Living             SAB        IAB        Ectopic        Multiple        Live Births               Obstetric Comments   Vaginal 2014  Menarche=11  Regular heavy-on OC            Exercise: the importance of regular exercise/physical activity was discussed. Recommend exercise 3-5 times per week for at least 30 minutes.       Depression Screening and Follow-up Plan: Patient was screened for depression during today's encounter. They screened negative with a PHQ-2 score of 0.    Tobacco Cessation Counseling: Tobacco cessation counseling was provided. The patient is sincerely urged to quit consumption of tobacco. She is not ready to quit tobacco. Risk  "of OC with smoking and > 35 years of age        History of Present Illness     Adult Annual Physical:  Patient presents for annual physical.     Diet and Physical Activity:  - Diet/Nutrition:. Could improve  - Exercise:. Could improve    Depression Screening:  - PHQ-2 Score: 0    General Health:  - Sleep:. Fair  - Hearing: normal hearing bilateral ears.  - Vision: most recent eye exam < 1 year ago.    /GYN Health:  - Follows with GYN: yes.   - Menopause: premenopausal.   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  (Mitral valve stenosis).  Up-to-date with GYN    Objective     /68 (BP Location: Left arm, Cuff Size: Large)   Pulse 85   Temp 97.7 °F (36.5 °C) (Temporal)   Resp 16   Ht 5' 2\" (1.575 m)   Wt 119 kg (263 lb)   SpO2 96%   BMI 48.10 kg/m²     Physical Exam  Vitals and nursing note reviewed.   Constitutional:       General: She is not in acute distress.     Appearance: Normal appearance. She is well-developed.      Comments: 39-year-old female appears stated age   HENT:      Right Ear: Tympanic membrane normal.      Left Ear: Tympanic membrane normal.      Nose: Nose normal.   Eyes:      Conjunctiva/sclera: Conjunctivae normal.   Neck:      Vascular: No carotid bruit.   Cardiovascular:      Rate and Rhythm: Normal rate and regular rhythm.      Heart sounds: No murmur heard.  Pulmonary:      Effort: Pulmonary effort is normal. No respiratory distress.      Breath sounds: Normal breath sounds.   Abdominal:      General: Bowel sounds are normal.      Palpations: Abdomen is soft.      Tenderness: " There is no abdominal tenderness.   Musculoskeletal:         General: No swelling.   Skin:     Findings: No rash.   Neurological:      Mental Status: She is alert and oriented to person, place, and time.   Psychiatric:         Mood and Affect: Mood normal.         Behavior: Behavior normal.         Thought Content: Thought content normal.         Judgment: Judgment normal.

## 2024-10-21 ENCOUNTER — PATIENT MESSAGE (OUTPATIENT)
Dept: FAMILY MEDICINE CLINIC | Facility: CLINIC | Age: 39
End: 2024-10-21

## 2024-10-23 DIAGNOSIS — E66.01 MORBID OBESITY WITH BMI OF 45.0-49.9, ADULT (HCC): Primary | ICD-10-CM

## 2024-10-23 RX ORDER — SEMAGLUTIDE 0.25 MG/.5ML
INJECTION, SOLUTION SUBCUTANEOUS
Qty: 2 ML | Refills: 0 | Status: SHIPPED | OUTPATIENT
Start: 2024-10-23

## 2024-10-25 ENCOUNTER — TELEPHONE (OUTPATIENT)
Dept: FAMILY MEDICINE CLINIC | Facility: CLINIC | Age: 39
End: 2024-10-25

## 2024-10-25 ENCOUNTER — TELEPHONE (OUTPATIENT)
Age: 39
End: 2024-10-25

## 2024-10-29 ENCOUNTER — TELEPHONE (OUTPATIENT)
Dept: FAMILY MEDICINE CLINIC | Facility: CLINIC | Age: 39
End: 2024-10-29

## 2024-10-29 ENCOUNTER — TELEPHONE (OUTPATIENT)
Age: 39
End: 2024-10-29

## 2024-10-29 NOTE — TELEPHONE ENCOUNTER
PA for Wegovy 0.25MG/0.5ML SUBMITTED     via    []CMM-KEY:   []Surescripts-Case ID #   []Availity-Auth ID # NDC #   [x]Faxed to plan - [employer]  []Other website   []Phone call Case ID #     Office notes sent, clinical questions answered. Awaiting determination    Turnaround time for your insurance to make a decision on your Prior Authorization can take 7-21 business days.

## 2024-11-01 ENCOUNTER — PATIENT MESSAGE (OUTPATIENT)
Dept: FAMILY MEDICINE CLINIC | Facility: CLINIC | Age: 39
End: 2024-11-01

## 2024-11-02 LAB
ALBUMIN SERPL-MCNC: 4.1 G/DL (ref 3.5–5.7)
ALP SERPL-CCNC: 69 U/L (ref 35–120)
ALT SERPL-CCNC: 12 U/L
ANION GAP SERPL CALCULATED.3IONS-SCNC: 9 MMOL/L (ref 3–11)
AST SERPL-CCNC: 12 U/L
BILIRUB SERPL-MCNC: 0.4 MG/DL (ref 0.2–1)
BUN SERPL-MCNC: 17 MG/DL (ref 7–25)
CALCIUM SERPL-MCNC: 9 MG/DL (ref 8.5–10.5)
CHLORIDE SERPL-SCNC: 106 MMOL/L (ref 100–109)
CHOLEST SERPL-MCNC: 189 MG/DL
CHOLEST/HDLC SERPL: 5.1 {RATIO}
CO2 SERPL-SCNC: 26 MMOL/L (ref 21–31)
CREAT SERPL-MCNC: 0.72 MG/DL (ref 0.4–1.1)
CYTOLOGY CMNT CVX/VAG CYTO-IMP: NORMAL
GFR/BSA.PRED SERPLBLD CYS-BASED-ARV: 108 ML/MIN/{1.73_M2}
GLUCOSE SERPL-MCNC: 89 MG/DL (ref 65–99)
HDLC SERPL-MCNC: 37 MG/DL (ref 23–92)
LDLC SERPL CALC-MCNC: 137 MG/DL
NONHDLC SERPL-MCNC: 152 MG/DL
POTASSIUM SERPL-SCNC: 4.6 MMOL/L (ref 3.5–5.2)
PROT SERPL-MCNC: 6.7 G/DL (ref 6.3–8.3)
SODIUM SERPL-SCNC: 141 MMOL/L (ref 135–145)
TRIGL SERPL-MCNC: 73 MG/DL
TSH SERPL-ACNC: 2.95 UIU/ML (ref 0.45–5.33)

## 2024-11-03 LAB
EST. AVERAGE GLUCOSE BLD GHB EST-MCNC: 123 MG/DL
HBA1C MFR BLD: 5.9 %

## 2024-11-17 ENCOUNTER — RESULTS FOLLOW-UP (OUTPATIENT)
Dept: FAMILY MEDICINE CLINIC | Facility: CLINIC | Age: 39
End: 2024-11-17

## 2024-11-17 NOTE — RESULT ENCOUNTER NOTE
Recent labs with slight bump in hemoglobin A1c, also low HDL and slightly elevated LDL which will all improve with dietary and fitness measures

## 2025-03-06 ENCOUNTER — OFFICE VISIT (OUTPATIENT)
Dept: BARIATRICS | Facility: CLINIC | Age: 40
End: 2025-03-06
Payer: COMMERCIAL

## 2025-03-06 VITALS
RESPIRATION RATE: 20 BRPM | SYSTOLIC BLOOD PRESSURE: 124 MMHG | HEIGHT: 62 IN | BODY MASS INDEX: 50.16 KG/M2 | WEIGHT: 272.6 LBS | DIASTOLIC BLOOD PRESSURE: 78 MMHG | HEART RATE: 111 BPM | TEMPERATURE: 98.4 F | OXYGEN SATURATION: 99 %

## 2025-03-06 DIAGNOSIS — E66.01 MORBID OBESITY (HCC): Primary | ICD-10-CM

## 2025-03-06 DIAGNOSIS — Z72.0 TOBACCO ABUSE: ICD-10-CM

## 2025-03-06 DIAGNOSIS — R73.03 PREDIABETES: ICD-10-CM

## 2025-03-06 DIAGNOSIS — F41.9 ANXIETY: ICD-10-CM

## 2025-03-06 PROCEDURE — 99204 OFFICE O/P NEW MOD 45 MIN: CPT | Performed by: PHYSICIAN ASSISTANT

## 2025-03-06 RX ORDER — TIRZEPATIDE 2.5 MG/.5ML
2.5 INJECTION, SOLUTION SUBCUTANEOUS WEEKLY
Qty: 2 ML | Refills: 0 | Status: SHIPPED | OUTPATIENT
Start: 2025-03-06 | End: 2025-04-03

## 2025-03-06 RX ORDER — MEDROXYPROGESTERONE ACETATE 150 MG/ML
INJECTION, SUSPENSION INTRAMUSCULAR
COMMUNITY
Start: 2024-10-28

## 2025-03-06 NOTE — PROGRESS NOTES
Assessment/Plan:    Morbid obesity (HCC)  -Discussed options of HealthyCORE-Intensive Lifestyle Intervention Program, Very Low Calorie Diet-VLCD, Conservative Program, Ulices-En-Y Gastric Bypass, and Vertical Sleeve Gastrectomy and the role of weight loss medications.  -Not a candidate for sympathomimetics  -Initial weight loss goal of 5-10% weight loss for improved health  -Screening labs: reviewed CMP, Lipid panel, TSH, HgbA1c  -Patient is interested in pursuing  conservative program  -Calorie goals, sample menu, portion size guidelines, and food logging reviewed with the patient.    -patient is also interested in AOMs. Discussed the importance of consistent dietary and lifestyle changes for long term success.  -medication agreement signed  -denies personal hx pancreatitis and family hx MEN2 or MTC  -Will start Zepbound. SE profile reviewed.    -Weight  + meds: Zoloft, Depo Provera        Prediabetes  -HgbA1c 5.9  -avoid/limit refined carbohydrates    Anxiety  -well controlled  -On Zoloft ( potentially weight +)    Tobacco abuse  -counseled on cessation  -previously on Wellbutrin, ineffective    Goals:    Food log (ie.) [email or web address],sparkpeople.com,loseit.com,calorieking.com,etc.   No sugary beverages. At least 64oz of water daily.  Increase physical activity by 10 minutes daily. Gradually increase physical activity to a goal of 5 days per week for 30 minutes of MODERATE intensity PLUS 2 days per week of FULL BODY resistance training  2801-1344 calories per day  5-10 servings of fruits and vegetables per day  25-35 grams of dietary fiber per day  90 grams protein per day    Visit [email or web address] for further information/injection instructions. Please eat small frequent meals to help reduce nausea. Lemon water and saltine crackers may help with this. If you experience fever, nausea/vomiting, and pain radiating to your back this may be a sign of pancreatitis. Please have ER evaluation with this occurs.       Follow up in approximately  4 months  with Non-Surgical Physician/Advanced Practitioner.    Diagnoses and all orders for this visit:    Morbid obesity (HCC)  -     tirzepatide (Zepbound) 2.5 mg/0.5 mL auto-injector; Inject 0.5 mL (2.5 mg total) under the skin once a week for 28 days    BMI 45.0-49.9, adult (HCC)  -     tirzepatide (Zepbound) 2.5 mg/0.5 mL auto-injector; Inject 0.5 mL (2.5 mg total) under the skin once a week for 28 days    Prediabetes    Anxiety    Tobacco abuse    Other orders  -     medroxyPROGESTERone (DEPO-PROVERA) 150 mg/mL injection          Subjective:   Chief Complaint   Patient presents with    Consult       Patient ID: Saniya Wilson  is a 40 y.o. female with excess weight/obesity here to pursue weight managment.    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  Sleep: 8 hours, + STOP Bang 4/8 but negative home sleep study in Jan 2023  Smoking: cigarettes    -Has been on Zoloft for over 5 years for anxiety  -Has been on Depo since Oct for heavy menses and cramping      B: everything bagel + cream cheese OR country crock butter + 2 links turkey sausage + cheese  S:  chips or veggies dipped in ranch  L:  creamy chicken wild rice soup or sandwich  S: skips  D: beef roast + potato + carrots OR chicken potpie with biscuit OR chicken nuggets + mac and cheese  S: skips    The following portions of the patient's history were reviewed and  "updated as appropriate: allergies, current medications, past family history, past medical history, past social history, past surgical history, and problem list.    Review of Systems   Constitutional:  Negative for chills and fever.   HENT:  Negative for sore throat.    Respiratory:  Negative for cough and shortness of breath.    Cardiovascular:  Negative for chest pain and palpitations.   Gastrointestinal:  Negative for abdominal pain, constipation, diarrhea, nausea and vomiting.   Genitourinary:  Negative for dysuria.   Musculoskeletal:  Negative for arthralgias.   Skin:  Negative for rash.   Neurological:  Negative for dizziness and headaches.   Psychiatric/Behavioral:  Negative for dysphoric mood. The patient is not nervous/anxious.        Objective:    /78 (BP Location: Right arm, Patient Position: Sitting, Cuff Size: Large)   Pulse (!) 111   Temp 98.4 °F (36.9 °C) (Temporal)   Resp 20   Ht 5' 2\" (1.575 m)   Wt 124 kg (272 lb 9.6 oz)   SpO2 99%   BMI 49.86 kg/m²     Physical Exam  Vitals and nursing note reviewed.      Constitutional   General appearance: Abnormal.  well developed and morbidly obese.   Eyes No conjunctival pallor.   Ears, Nose, Mouth, and Throat Oral mucosa moist.   Pulmonary   Respiratory effort: No increased work of breathing or signs of respiratory distress.    Auscultation of lungs: Clear to auscultation, equal breath sounds bilaterally, no wheezes, no rales, no rhonci.    Cardiovascular   Auscultation of heart: Normal rate and rhythm, normal S1 and S2, + murmur  Examination of extremities for edema and/or varicosities: + edema bilaterally  Abdomen   Abdomen: Abnormal.  The abdomen was obese. Bowel sounds were normal. The abdomen was soft and nontender.   Musculoskeletal   Gait and station: Normal.    Psychiatric   Orientation to person, place and time: Normal.    Affect: appropriate   "

## 2025-03-06 NOTE — PATIENT INSTRUCTIONS
Goals:    Food log (ie.) www.[employer].com,Mintigo.com,1Life Healthcareit.com,Brandtree.com,etc.   No sugary beverages. At least 64oz of water daily.  Increase physical activity by 10 minutes daily. Gradually increase physical activity to a goal of 5 days per week for 30 minutes of MODERATE intensity PLUS 2 days per week of FULL BODY resistance training  7505-2478 calories per day  5-10 servings of fruits and vegetables per day  25-35 grams of dietary fiber per day  90 grams protein per day    Visit [email or web address] for further information/injection instructions. Please eat small frequent meals to help reduce nausea. Lemon water and saltine crackers may help with this. If you experience fever, nausea/vomiting, and pain radiating to your back this may be a sign of pancreatitis. Please have ER evaluation with this occurs.

## 2025-03-06 NOTE — ASSESSMENT & PLAN NOTE
-Discussed options of HealthyCORE-Intensive Lifestyle Intervention Program, Very Low Calorie Diet-VLCD, Conservative Program, Ulices-En-Y Gastric Bypass, and Vertical Sleeve Gastrectomy and the role of weight loss medications.  -Not a candidate for sympathomimetics  -Initial weight loss goal of 5-10% weight loss for improved health  -Screening labs: reviewed CMP, Lipid panel, TSH, HgbA1c  -Patient is interested in pursuing  conservative program  -Calorie goals, sample menu, portion size guidelines, and food logging reviewed with the patient.    -patient is also interested in AOMs. Discussed the importance of consistent dietary and lifestyle changes for long term success.  -medication agreement signed  -denies personal hx pancreatitis and family hx MEN2 or MTC  -Will start Zepbound. SE profile reviewed.    -Weight  + meds: Zoloft, Depo Provera

## 2025-03-19 ENCOUNTER — TELEPHONE (OUTPATIENT)
Dept: BARIATRICS | Facility: CLINIC | Age: 40
End: 2025-03-19

## 2025-03-20 DIAGNOSIS — R73.03 PREDIABETES: Primary | ICD-10-CM

## 2025-03-20 RX ORDER — TIRZEPATIDE 2.5 MG/.5ML
INJECTION, SOLUTION SUBCUTANEOUS
Qty: 2 ML | Refills: 0 | Status: SHIPPED | OUTPATIENT
Start: 2025-03-20 | End: 2025-03-25

## 2025-03-25 DIAGNOSIS — R73.03 PREDIABETES: Primary | ICD-10-CM

## 2025-03-25 RX ORDER — METFORMIN HYDROCHLORIDE 750 MG/1
TABLET, EXTENDED RELEASE ORAL
Qty: 60 TABLET | Refills: 3 | Status: SHIPPED | OUTPATIENT
Start: 2025-03-25

## 2025-03-25 NOTE — TELEPHONE ENCOUNTER
Please inform patient. Is she agreeable to trial of Metformin which is a daily oral medication? Potential side effects include loose stools, more frequent stools, abdominal cramping

## 2025-03-25 NOTE — TELEPHONE ENCOUNTER
Patient returning call. Advised of denial.  Explained note from provider regarding Metformin and side effects.  She stated that she is agreeable to trying Metformin.    Uses  [id number] - [place] - [address]  [address], [place] [address]  Phone: [phone]  Fax: [phone]     #[phone]